# Patient Record
Sex: MALE | Race: WHITE | NOT HISPANIC OR LATINO
[De-identification: names, ages, dates, MRNs, and addresses within clinical notes are randomized per-mention and may not be internally consistent; named-entity substitution may affect disease eponyms.]

---

## 2019-04-09 PROBLEM — Z00.00 ENCOUNTER FOR PREVENTIVE HEALTH EXAMINATION: Status: ACTIVE | Noted: 2019-04-09

## 2019-05-22 ENCOUNTER — RECORD ABSTRACTING (OUTPATIENT)
Age: 59
End: 2019-05-22

## 2019-05-22 DIAGNOSIS — G40.909 EPILEPSY, UNSPECIFIED, NOT INTRACTABLE, W/OUT STATUS EPILEPTICUS: ICD-10-CM

## 2019-05-22 DIAGNOSIS — Z86.69 PERSONAL HISTORY OF OTHER DISEASES OF THE NERVOUS SYSTEM AND SENSE ORGANS: ICD-10-CM

## 2019-05-30 ENCOUNTER — APPOINTMENT (OUTPATIENT)
Dept: NEUROLOGY | Facility: CLINIC | Age: 59
End: 2019-05-30
Payer: MEDICARE

## 2019-05-30 VITALS
SYSTOLIC BLOOD PRESSURE: 113 MMHG | HEIGHT: 68 IN | BODY MASS INDEX: 23.49 KG/M2 | HEART RATE: 75 BPM | DIASTOLIC BLOOD PRESSURE: 76 MMHG | WEIGHT: 155 LBS

## 2019-05-30 PROCEDURE — 64615 CHEMODENERV MUSC MIGRAINE: CPT

## 2019-05-30 PROCEDURE — 99212 OFFICE O/P EST SF 10 MIN: CPT | Mod: 25

## 2019-05-30 RX ORDER — ONABOTULINUMTOXINA 100 [USP'U]/1
100 INJECTION, POWDER, LYOPHILIZED, FOR SOLUTION INTRADERMAL; INTRAMUSCULAR
Qty: 1 | Refills: 0 | Status: COMPLETED | OUTPATIENT
Start: 2019-05-30

## 2019-05-30 RX ORDER — ONABOTULINUMTOXINA 100 [USP'U]/1
100 INJECTION, POWDER, LYOPHILIZED, FOR SOLUTION INTRADERMAL; INTRAMUSCULAR
Qty: 1 | Refills: 0 | Status: COMPLETED
Start: 2019-05-30

## 2019-05-30 RX ADMIN — ONABOTULINUMTOXINA 1 UNIT: 100 INJECTION, POWDER, LYOPHILIZED, FOR SOLUTION INTRADERMAL; INTRAMUSCULAR at 00:00

## 2019-05-30 NOTE — PROCEDURE
[FreeTextEntry1] : Procedure:  Botulinum toxin for chronic migraine [FreeTextEntry2] : headache [FreeTextEntry3] : Procedure:  Botulinum toxin for chronic migraine\par \par Risks and benefits of procedure explained.  Consent for botulinum toxin for chronic migraine signed in chart.  \par \par 200 Units botulinum toxin reconstituted with 4 ml 0.9NS (5U/0.1cc).  \par \par Patient prepped with alcohol/ethyl chloride.  10U corrugators, 5U procerus, 20U frontalis, 40 temporalis, 30 occipitalis, 20 cervical paraspinals, 30 trapezius.  Total of 155U divided in 31 sites.  45U botulinum wasted.  \par \par Patient tolerated procedure well without complications.    \par

## 2019-05-30 NOTE — CONSULT LETTER
[FreeTextEntry1] : Since his last chemodenervation, Mr. Peralta had not had any benefit to botulinum injection.  Headaches on the left side still persists without any significant improvement after the last 2 trials.  Is currently here for last trial of chemodenervation as per Dr. Najjar's recommendations.  Tolerated injections well without any significant side effects.  \par \par Focused Exam:  NAD/Alert and oriented.  Speech fluent, nondysarthric.  CN grossly intact.  NF/NE 5/5. MS 5/5 throughout. Sensory subjectively normal.  No ataxia.\par \par 58 yo M w/ h/o epilepsy s/p surgery with persistent L sided headache at surgical site possible dural entrapment currently here for chemodenervation for intractable headaches.  \par \par Plan:   \par 1.	Return to office in 3 - 4 months for further injections as deemed beneficial\par 2.	F/u with Dr. Najjar\par

## 2019-06-11 ENCOUNTER — APPOINTMENT (OUTPATIENT)
Dept: NEUROLOGY | Facility: CLINIC | Age: 59
End: 2019-06-11

## 2019-07-11 ENCOUNTER — APPOINTMENT (OUTPATIENT)
Dept: NEUROLOGY | Facility: CLINIC | Age: 59
End: 2019-07-11

## 2019-07-16 ENCOUNTER — APPOINTMENT (OUTPATIENT)
Dept: NEUROLOGY | Facility: CLINIC | Age: 59
End: 2019-07-16

## 2019-07-22 ENCOUNTER — APPOINTMENT (OUTPATIENT)
Dept: NEUROLOGY | Facility: CLINIC | Age: 59
End: 2019-07-22
Payer: MEDICARE

## 2019-07-22 VITALS
DIASTOLIC BLOOD PRESSURE: 74 MMHG | WEIGHT: 150 LBS | BODY MASS INDEX: 22.22 KG/M2 | SYSTOLIC BLOOD PRESSURE: 109 MMHG | HEART RATE: 62 BPM | HEIGHT: 69 IN

## 2019-07-22 DIAGNOSIS — Z78.9 OTHER SPECIFIED HEALTH STATUS: ICD-10-CM

## 2019-07-22 PROCEDURE — 99214 OFFICE O/P EST MOD 30 MIN: CPT

## 2019-07-25 PROBLEM — Z78.9 DOES NOT USE ILLICIT DRUGS: Status: ACTIVE | Noted: 2019-07-25

## 2019-07-25 NOTE — DISCUSSION/SUMMARY
[FreeTextEntry1] : Patient with Epilepsy and Muscle tension headaches.\par \par Epilepsy remains stable. Headaches are unresponsive to Botox x3 rounds. At this time we recommend the following: \par \par 1. Stop Botox\par 2. Start Nortriptyline 10mg qHS x 5 days then increase to 20mg. Will call the office in 3 weeks at which time we can make further adjustments if needed. \par 3. Serology as ordered\par 4. Follow up in 8-10 weeks

## 2019-07-25 NOTE — PHYSICAL EXAM
[General Appearance - Alert] : alert [General Appearance - In No Acute Distress] : in no acute distress [Oriented To Time, Place, And Person] : oriented to person, place, and time [Impaired Insight] : insight and judgment were intact [Affect] : the affect was normal [Person] : oriented to person [Place] : oriented to place [Time] : oriented to time [Motor Strength] : muscle strength was normal in all four extremities [2+] : Ankle jerk left 2+ [Extraocular Movements] : extraocular movements were intact [Motor Strength Upper Extremities Bilaterally] : strength was normal in both upper extremities [Motor Strength Lower Extremities Bilaterally] : strength was normal in both lower extremities

## 2019-07-25 NOTE — HISTORY OF PRESENT ILLNESS
[FreeTextEntry1] : Mr. NARANJO presents today for a follow up visit of Epilepsy and Muscle tension headaches.\par \par Epilepsy remains stable. Continues to have infrequent episodes of memory lapses and losing train of thought. Episodes are unchanged from previous. He remains compliant with medication regimen without side effects. \par \par He has thus far received 3 rounds of Botox with Dr. Velásquez, last one in 05/2019. He denies any changes to headaches with treatment. Continues to report generalized pressure occurring 2-3 times a week unresponsive to OTC and does not have any migraine component.

## 2019-09-12 ENCOUNTER — APPOINTMENT (OUTPATIENT)
Dept: NEUROLOGY | Facility: CLINIC | Age: 59
End: 2019-09-12

## 2019-10-02 ENCOUNTER — APPOINTMENT (OUTPATIENT)
Dept: NEUROLOGY | Facility: CLINIC | Age: 59
End: 2019-10-02
Payer: MEDICARE

## 2019-10-02 ENCOUNTER — OUTPATIENT (OUTPATIENT)
Dept: OUTPATIENT SERVICES | Facility: HOSPITAL | Age: 59
LOS: 1 days | Discharge: HOME | End: 2019-10-02

## 2019-10-02 VITALS
BODY MASS INDEX: 22.22 KG/M2 | HEART RATE: 101 BPM | DIASTOLIC BLOOD PRESSURE: 79 MMHG | SYSTOLIC BLOOD PRESSURE: 112 MMHG | WEIGHT: 150 LBS | HEIGHT: 69 IN

## 2019-10-02 DIAGNOSIS — G44.209 TENSION-TYPE HEADACHE, UNSPECIFIED, NOT INTRACTABLE: ICD-10-CM

## 2019-10-02 PROCEDURE — 99214 OFFICE O/P EST MOD 30 MIN: CPT

## 2019-10-03 LAB
CRP SERPL-MCNC: 3.21 MG/DL
ERYTHROCYTE [SEDIMENTATION RATE] IN BLOOD BY WESTERGREN METHOD: 5 MM/HR

## 2019-10-04 NOTE — DISCUSSION/SUMMARY
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 Likely related to surgical scarring however will rule out other etiologies. At this time we recommend the following: \par \par 1. Increase Pamelor to 30mg x 1 week then 40mg and continue\par 2. CT head \par 3. Serology as ordered\par 4. RTC in 6  months, will call to review testing once completed.

## 2019-10-04 NOTE — PHYSICAL EXAM
[General Appearance - Alert] : alert [Oriented To Time, Place, And Person] : oriented to person, place, and time [General Appearance - In No Acute Distress] : in no acute distress [Impaired Insight] : insight and judgment were intact [Affect] : the affect was normal [Person] : oriented to person [Place] : oriented to place [Time] : oriented to time [Motor Strength] : muscle strength was normal in all four extremities [2+] : Ankle jerk left 2+ [Motor Strength Upper Extremities Bilaterally] : strength was normal in both upper extremities [Motor Strength Lower Extremities Bilaterally] : strength was normal in both lower extremities

## 2019-10-04 NOTE — HISTORY OF PRESENT ILLNESS
[FreeTextEntry1] : Mr. NARANJO presents today for a follow up visit of Epilepsy (has a non-functioning VNS) and Muscle tension headaches.\par \par Epilepsy remains stable. Continues to have infrequent episodes of memory lapses and losing train of thought. Episodes are unchanged from previous. He remains compliant with medication regimen without side effects. \par \par He has thus far received 3 rounds of Botox with Dr. Velásquez, last one in 05/2019. He denies any changes to headaches with treatment. Continues to report left sided achy pain. No associated migraine component. Has yet to respond to Pamelor 20mg started since last visit. \par \par Was seen by neurosx Dr. Nunn who feels symptoms are related to surgical scar. Symptoms continue to occur a few times a week. Feels is related to the amount of sleep he gets per night.

## 2019-10-07 LAB — ANA SER IF-ACNC: NEGATIVE

## 2019-10-28 ENCOUNTER — RX RENEWAL (OUTPATIENT)
Age: 59
End: 2019-10-28

## 2020-01-09 ENCOUNTER — RX RENEWAL (OUTPATIENT)
Age: 60
End: 2020-01-09

## 2020-04-15 ENCOUNTER — APPOINTMENT (OUTPATIENT)
Dept: NEUROLOGY | Facility: CLINIC | Age: 60
End: 2020-04-15

## 2020-08-15 ENCOUNTER — TRANSCRIPTION ENCOUNTER (OUTPATIENT)
Age: 60
End: 2020-08-15

## 2020-10-15 ENCOUNTER — APPOINTMENT (OUTPATIENT)
Dept: SURGERY | Facility: CLINIC | Age: 60
End: 2020-10-15
Payer: MEDICARE

## 2020-10-15 VITALS
WEIGHT: 164 LBS | HEART RATE: 77 BPM | TEMPERATURE: 97.2 F | DIASTOLIC BLOOD PRESSURE: 84 MMHG | HEIGHT: 69 IN | BODY MASS INDEX: 24.29 KG/M2 | SYSTOLIC BLOOD PRESSURE: 132 MMHG

## 2020-10-15 DIAGNOSIS — K64.9 UNSPECIFIED HEMORRHOIDS: ICD-10-CM

## 2020-10-15 PROCEDURE — 46600 DIAGNOSTIC ANOSCOPY SPX: CPT

## 2020-10-15 PROCEDURE — 99203 OFFICE O/P NEW LOW 30 MIN: CPT | Mod: 25

## 2020-11-12 ENCOUNTER — APPOINTMENT (OUTPATIENT)
Dept: SURGERY | Facility: CLINIC | Age: 60
End: 2020-11-12
Payer: MEDICARE

## 2020-11-12 VITALS
SYSTOLIC BLOOD PRESSURE: 120 MMHG | HEART RATE: 78 BPM | DIASTOLIC BLOOD PRESSURE: 86 MMHG | WEIGHT: 153 LBS | HEIGHT: 69 IN | BODY MASS INDEX: 22.66 KG/M2 | TEMPERATURE: 96.5 F

## 2020-11-12 DIAGNOSIS — K64.5 PERIANAL VENOUS THROMBOSIS: ICD-10-CM

## 2020-11-12 PROCEDURE — 99072 ADDL SUPL MATRL&STAF TM PHE: CPT

## 2020-11-12 PROCEDURE — 99214 OFFICE O/P EST MOD 30 MIN: CPT

## 2020-11-12 NOTE — HISTORY OF PRESENT ILLNESS
[FreeTextEntry1] : This is a new patient visit for Mr. NARANJO who has complaint of hemorrhoids. Mr. NARANJO states that he started having pain with his hemorrhoids approximately 10 days ago. He noticed a swelling and pain. He uses over-the-counter cream for short period of time. He has had a colonoscopy in the past within the last 2 years. He is no family history of colorectal cancer.

## 2020-11-12 NOTE — PROCEDURE
[FreeTextEntry1] : Anoscopy performed using a disposable anoscope.  The patient was place in the left lateral decubitus position. After CHLOE was performed the anoscope was inserted and the distal rectum was evaluated along with the anal canal and anal margin.\par \par Findings:\par Examination of the perineum reveals an obvious thrombosed external hemorrhoid in the left lateral position there is moderate swelling but it feels soft to touch, there is no erythema induration or drainage or bleeding.\par \par CHLOE, good tone no palpable mass.\par \par Anoscopy, on-prolapsing internal hemorrhoids a smaller thrombosed internal hemorrhoid is also noted.

## 2020-11-12 NOTE — HISTORY OF PRESENT ILLNESS
[FreeTextEntry1] : This is a followup patient visit for Mr. NARANJO who has complaint of hemorrhoids. Mr. NARANJO states that he Is feeling much better. She has used the hydrocortisone cream and feels the pain and swelling has resolved. He denies any bleeding.

## 2020-11-12 NOTE — ASSESSMENT
[FreeTextEntry1] : Mr. NARANJO has a large thrombosed external hemorrhoid and a smaller thrombosed internal hemorrhoid. Both of these are in the early resolution phase. I've prescribed for him some hydrocortisone cream to use 2-3 times a day for the next 2 weeks. I've also advised increase fluid and take stool softeners sitz baths as needed and increase fiber intake. Will return to see me in 3 weeks or certainly sooner should any problems arise.

## 2020-11-12 NOTE — ASSESSMENT
[FreeTextEntry1] : Mr. NARANJO has a resolving thrombosed external hemorrhoid.  She is currently feeling much better and denies pain. He no longer has used hydrocortisone cream. He'll continue the dietary modifications for now. Her return to see me on a p.r.n. basis.

## 2020-11-12 NOTE — PHYSICAL EXAM
[FreeTextEntry1] : Looks and feels well.\par \par Vitals as noted above.\par \par Examination of the perineum reveals nearly resolved thrombosed external hemorrhoid with no significant swelling.

## 2020-11-30 ENCOUNTER — APPOINTMENT (OUTPATIENT)
Dept: NEUROLOGY | Facility: CLINIC | Age: 60
End: 2020-11-30

## 2020-12-14 ENCOUNTER — APPOINTMENT (OUTPATIENT)
Dept: NEUROLOGY | Facility: CLINIC | Age: 60
End: 2020-12-14
Payer: MEDICARE

## 2020-12-14 VITALS
WEIGHT: 161 LBS | TEMPERATURE: 97.4 F | DIASTOLIC BLOOD PRESSURE: 84 MMHG | HEIGHT: 69 IN | HEART RATE: 78 BPM | OXYGEN SATURATION: 97 % | BODY MASS INDEX: 23.85 KG/M2 | SYSTOLIC BLOOD PRESSURE: 125 MMHG

## 2020-12-14 PROCEDURE — 99072 ADDL SUPL MATRL&STAF TM PHE: CPT

## 2020-12-14 PROCEDURE — 99214 OFFICE O/P EST MOD 30 MIN: CPT

## 2020-12-14 NOTE — PHYSICAL EXAM
[General Appearance - Alert] : alert [General Appearance - In No Acute Distress] : in no acute distress [Oriented To Time, Place, And Person] : oriented to person, place, and time [Impaired Insight] : insight and judgment were intact [Affect] : the affect was normal [Person] : oriented to person [Place] : oriented to place [Time] : oriented to time [Motor Strength] : muscle strength was normal in all four extremities [2+] : Ankle jerk left 2+ [Tremor] : a tremor present [Motor Strength Upper Extremities Bilaterally] : strength was normal in both upper extremities [Motor Strength Lower Extremities Bilaterally] : strength was normal in both lower extremities [FreeTextEntry8] : B/L UE FINE TREMOR NOTED WITH ACTION.

## 2020-12-14 NOTE — HISTORY OF PRESENT ILLNESS
[FreeTextEntry1] : Mr. NARANJO presents today for a follow up visit of Epilepsy (has a non-functioning VNS) and Muscle tension headaches.\par \par Epilepsy remains stable. Continues to have infrequent episodes of memory lapses and losing train of thought. Episodes are unchanged from previous. He remains compliant with medication regimen without side effects. \par \par Is reporting some increase in B/L UE tremor. Has been on Primidone. Not affecting fine motor but is noticed. \par \par Compliant with medication as stated. Last blood work done was by PCP and AED levels were in range. Was noted to have subtherapeutic Vitamin D but reports he did not speak to PCP regarding this. \par \par He denies any new neurological complaints at this time. \par \par

## 2020-12-14 NOTE — DISCUSSION/SUMMARY
[Medically Refractory (seizure within the last year)] : Medically Refractory (seizure within the last year) [Simple Partial] : simple partial [Risks Associated with Driving/[place] Law] : As per my usual protocol, the patient was advised in regards to risks and driving privileges associated with the [place] Guidelines.  [Safety Recommendations] : The patient was advised in regards to the risk of seizures and general seizure safety recommendations including not to be bathing alone, climbing to high places and operating heavy machinery. [Compliance with Medications] : The importance of compliance with medications was reinforced. [Sleep Hygiene/Sleep Disruption Risks] : Sleep hygiene and the risks of sleep disruption were discussed. [FreeTextEntry1] : Patient with Epilepsy.\par \par Dong well, no changes. At this time we recommend the following: \par \par 1. Continue VPA: [phone]\par 2. Continue Keppra 1250mg BID\par 3. Continue Primidone 250mg TID\par 4. Serology as ordered. If ammonia elevated, may be causing increase in tremor, may start Carnitor.\par 5. Advised to follow with PCP for Vitamin D levels\par \par All questions and concerns were addressed. Emotional support was rendered.

## 2020-12-15 LAB — AMMONIA PLAS-MCNC: 34 UMOL/L

## 2020-12-16 LAB
ALBUMIN SERPL ELPH-MCNC: 4.5 G/DL
ALP BLD-CCNC: 75 U/L
ALT SERPL-CCNC: 23 U/L
ANION GAP SERPL CALC-SCNC: 15 MMOL/L
AST SERPL-CCNC: 27 U/L
BASOPHILS # BLD AUTO: 0.02 K/UL
BASOPHILS NFR BLD AUTO: 0.5 %
BILIRUB SERPL-MCNC: 0.3 MG/DL
BUN SERPL-MCNC: 20 MG/DL
CALCIUM SERPL-MCNC: 9.6 MG/DL
CHLORIDE SERPL-SCNC: 103 MMOL/L
CO2 SERPL-SCNC: 24 MMOL/L
CREAT SERPL-MCNC: 1 MG/DL
EOSINOPHIL # BLD AUTO: 0.04 K/UL
EOSINOPHIL NFR BLD AUTO: 0.9 %
GLUCOSE SERPL-MCNC: 83 MG/DL
HCT VFR BLD CALC: 44.2 %
HGB BLD-MCNC: 14.3 G/DL
IMM GRANULOCYTES NFR BLD AUTO: 0.2 %
LYMPHOCYTES # BLD AUTO: 1.65 K/UL
LYMPHOCYTES NFR BLD AUTO: 38.4 %
MAN DIFF?: NORMAL
MCHC RBC-ENTMCNC: 29.4 PG
MCHC RBC-ENTMCNC: 32.4 G/DL
MCV RBC AUTO: 90.8 FL
MONOCYTES # BLD AUTO: 0.52 K/UL
MONOCYTES NFR BLD AUTO: 12.1 %
NEUTROPHILS # BLD AUTO: 2.06 K/UL
NEUTROPHILS NFR BLD AUTO: 47.9 %
PHENOBARB SERPL QL: 40.9 UG/ML
PLATELET # BLD AUTO: 156 K/UL
POTASSIUM SERPL-SCNC: 4.8 MMOL/L
PROT SERPL-MCNC: 7.4 G/DL
RBC # BLD: 4.87 M/UL
RBC # FLD: 14.5 %
SODIUM SERPL-SCNC: 142 MMOL/L
VALPROATE SERPL-MCNC: 69 UG/ML
WBC # FLD AUTO: 4.3 K/UL

## 2020-12-22 LAB — LEVETIRACETAM SERPL-MCNC: 17 UG/ML

## 2020-12-23 ENCOUNTER — NON-APPOINTMENT (OUTPATIENT)
Age: 60
End: 2020-12-23

## 2021-06-03 ENCOUNTER — OUTPATIENT (OUTPATIENT)
Dept: OUTPATIENT SERVICES | Facility: HOSPITAL | Age: 61
LOS: 1 days | Discharge: HOME | End: 2021-06-03
Payer: MEDICARE

## 2021-06-03 ENCOUNTER — RX RENEWAL (OUTPATIENT)
Age: 61
End: 2021-06-03

## 2021-06-03 ENCOUNTER — APPOINTMENT (OUTPATIENT)
Dept: PLASTIC SURGERY | Facility: CLINIC | Age: 61
End: 2021-06-03
Payer: MEDICARE

## 2021-06-03 VITALS — HEIGHT: 68 IN | WEIGHT: 150 LBS | BODY MASS INDEX: 22.73 KG/M2

## 2021-06-03 DIAGNOSIS — M72.0 PALMAR FASCIAL FIBROMATOSIS [DUPUYTREN]: ICD-10-CM

## 2021-06-03 PROCEDURE — 99203 OFFICE O/P NEW LOW 30 MIN: CPT

## 2021-06-03 PROCEDURE — 73130 X-RAY EXAM OF HAND: CPT | Mod: 26,50

## 2021-06-04 NOTE — HISTORY OF PRESENT ILLNESS
[FreeTextEntry1] : 61 yr old\par RHD\par retired--disability from epilepsy, h/o seizures\par short term memory issues\par used to work as musician--now retired\par \par nonsmoker\par nondiabetic\par no alcohol use\par no h/o traums to hands\par \par noticed B/L issues (flexion of finger) about 2-3 years ago

## 2021-06-04 NOTE — ASSESSMENT
[FreeTextEntry1] : discussed options for management of Dupuytrens dz\par \par  Patient was prepped and draped in the usual sterile fashion using betadine. 10 cc of 1% lidocaine with epinephrine was used to numb the region. The palmar cord was identified and excised. The incision was closed primarily. The patient tolerated the procedure well and without any complications.\par \par \par Not good candidate for xiaflex injection or needle aponeurotomy\par \par discussed recurrence, need for Hand OT, need for additional surgery, lack of improvement\par \par pt seen w mother (undergoing nasal recon w me)\par \par all ?s asnwered\par \par Due to COVID 19, pre-visit patient instructions were explained to the patient and their symptoms were checked upon arrival.  \par Masks were used by the health care providers and staff and the examination room was cleaned after the patient visit was completed.\par \par B/L hand xryas\par

## 2021-06-04 NOTE — PHYSICAL EXAM
[NI] : Normal [de-identified] : left hand:  1,2,3 normal   4 w MC cord, can almost fully extend 4th digit but mild limitation on terminal extension   5th digit left PIPJ approx 100 degrees.  cord on both 4+5th digits extends to P1\par \par rigth hand:   1,2,3 normal   4 w MC cord, can almost fully extend 4th digit but mild limitation on terminal extension   5th digit left PIPJ approx  degrees.  130 degrees 0cord on both 4+5th digits extends to P1

## 2021-06-07 ENCOUNTER — RX RENEWAL (OUTPATIENT)
Age: 61
End: 2021-06-07

## 2021-06-07 ENCOUNTER — APPOINTMENT (OUTPATIENT)
Dept: NEUROLOGY | Facility: CLINIC | Age: 61
End: 2021-06-07
Payer: MEDICARE

## 2021-06-07 VITALS
BODY MASS INDEX: 24.25 KG/M2 | TEMPERATURE: 97.8 F | HEIGHT: 68 IN | SYSTOLIC BLOOD PRESSURE: 118 MMHG | WEIGHT: 160 LBS | HEART RATE: 74 BPM | DIASTOLIC BLOOD PRESSURE: 80 MMHG

## 2021-06-07 PROCEDURE — 99213 OFFICE O/P EST LOW 20 MIN: CPT

## 2021-06-07 RX ORDER — NORTRIPTYLINE HYDROCHLORIDE 10 MG/1
10 CAPSULE ORAL
Qty: 270 | Refills: 2 | Status: DISCONTINUED | COMMUNITY
Start: 2019-07-23 | End: 2021-06-07

## 2021-06-07 NOTE — HISTORY OF PRESENT ILLNESS
[FreeTextEntry1] : Mr. NARANJO presents today for a follow up visit of Epilepsy (has a non-functioning VNS) and Muscle tension headaches.\par \par Epilepsy remains stable. Continues to have infrequent episodes of memory lapses and losing train of thought. Episodes are unchanged from previous and feel are even less then before. He remains compliant with medication regimen without side effects. \par \par Continues to report B/L UE tremor. Has been on Primidone, not much response. Also continues to report frontal headaches, felt to be related to scar tissue secondary to surgery. \par \par He denies any new neurological complaints at this time. \par \par

## 2021-06-07 NOTE — DISCUSSION/SUMMARY
[FreeTextEntry1] : Patient with Epilepsy (has a non-functioning VNS) and Muscle tension headaches.\par \par Epilepsy is stable and events are even less. Continues to report muscle tension headaches and tremor R>L. Given symptoms, recommend starting Propranolol to help with tremor and headaches. At this time we recommend the following: \par \par 1. Start propranolol 1omg TID x2 weeks then 20mg TID \par 2. TEB in 6 weeks. If doing well, change to Inderal LA 60mg.\par 3. Continue AED meds as above\par \par All questions and concerns were addressed. Emotional support was rendered. \par

## 2021-07-06 ENCOUNTER — OUTPATIENT (OUTPATIENT)
Dept: OUTPATIENT SERVICES | Facility: HOSPITAL | Age: 61
LOS: 1 days | Discharge: HOME | End: 2021-07-06
Payer: MEDICARE

## 2021-07-06 VITALS
HEIGHT: 68 IN | RESPIRATION RATE: 18 BRPM | WEIGHT: 159.39 LBS | DIASTOLIC BLOOD PRESSURE: 67 MMHG | TEMPERATURE: 97 F | OXYGEN SATURATION: 98 % | HEART RATE: 63 BPM | SYSTOLIC BLOOD PRESSURE: 114 MMHG

## 2021-07-06 DIAGNOSIS — Z01.818 ENCOUNTER FOR OTHER PREPROCEDURAL EXAMINATION: ICD-10-CM

## 2021-07-06 DIAGNOSIS — M72.0 PALMAR FASCIAL FIBROMATOSIS [DUPUYTREN]: ICD-10-CM

## 2021-07-06 DIAGNOSIS — Z98.890 OTHER SPECIFIED POSTPROCEDURAL STATES: Chronic | ICD-10-CM

## 2021-07-06 DIAGNOSIS — G40.109 LOCALIZATION-RELATED (FOCAL) (PARTIAL) SYMPTOMATIC EPILEPSY AND EPILEPTIC SYNDROMES WITH SIMPLE PARTIAL SEIZURES, NOT INTRACTABLE, WITHOUT STATUS EPILEPTICUS: Chronic | ICD-10-CM

## 2021-07-06 LAB
ALBUMIN SERPL ELPH-MCNC: 4.7 G/DL — SIGNIFICANT CHANGE UP (ref 3.5–5.2)
ALP SERPL-CCNC: 72 U/L — SIGNIFICANT CHANGE UP (ref 30–115)
ALT FLD-CCNC: 22 U/L — SIGNIFICANT CHANGE UP (ref 0–41)
ANION GAP SERPL CALC-SCNC: 9 MMOL/L — SIGNIFICANT CHANGE UP (ref 7–14)
AST SERPL-CCNC: 19 U/L — SIGNIFICANT CHANGE UP (ref 0–41)
BASOPHILS # BLD AUTO: 0.02 K/UL — SIGNIFICANT CHANGE UP (ref 0–0.2)
BASOPHILS NFR BLD AUTO: 0.4 % — SIGNIFICANT CHANGE UP (ref 0–1)
BILIRUB SERPL-MCNC: 0.3 MG/DL — SIGNIFICANT CHANGE UP (ref 0.2–1.2)
BUN SERPL-MCNC: 19 MG/DL — SIGNIFICANT CHANGE UP (ref 10–20)
CALCIUM SERPL-MCNC: 10.2 MG/DL — HIGH (ref 8.5–10.1)
CHLORIDE SERPL-SCNC: 104 MMOL/L — SIGNIFICANT CHANGE UP (ref 98–110)
CO2 SERPL-SCNC: 29 MMOL/L — SIGNIFICANT CHANGE UP (ref 17–32)
CREAT SERPL-MCNC: 0.8 MG/DL — SIGNIFICANT CHANGE UP (ref 0.7–1.5)
EOSINOPHIL # BLD AUTO: 0.05 K/UL — SIGNIFICANT CHANGE UP (ref 0–0.7)
EOSINOPHIL NFR BLD AUTO: 1 % — SIGNIFICANT CHANGE UP (ref 0–8)
GLUCOSE SERPL-MCNC: 96 MG/DL — SIGNIFICANT CHANGE UP (ref 70–99)
HCT VFR BLD CALC: 44.1 % — SIGNIFICANT CHANGE UP (ref 42–52)
HGB BLD-MCNC: 14.5 G/DL — SIGNIFICANT CHANGE UP (ref 14–18)
IMM GRANULOCYTES NFR BLD AUTO: 0.4 % — HIGH (ref 0.1–0.3)
LYMPHOCYTES # BLD AUTO: 1.75 K/UL — SIGNIFICANT CHANGE UP (ref 1.2–3.4)
LYMPHOCYTES # BLD AUTO: 35.4 % — SIGNIFICANT CHANGE UP (ref 20.5–51.1)
MCHC RBC-ENTMCNC: 29.2 PG — SIGNIFICANT CHANGE UP (ref 27–31)
MCHC RBC-ENTMCNC: 32.9 G/DL — SIGNIFICANT CHANGE UP (ref 32–37)
MCV RBC AUTO: 88.9 FL — SIGNIFICANT CHANGE UP (ref 80–94)
MONOCYTES # BLD AUTO: 0.41 K/UL — SIGNIFICANT CHANGE UP (ref 0.1–0.6)
MONOCYTES NFR BLD AUTO: 8.3 % — SIGNIFICANT CHANGE UP (ref 1.7–9.3)
NEUTROPHILS # BLD AUTO: 2.7 K/UL — SIGNIFICANT CHANGE UP (ref 1.4–6.5)
NEUTROPHILS NFR BLD AUTO: 54.5 % — SIGNIFICANT CHANGE UP (ref 42.2–75.2)
NRBC # BLD: 0 /100 WBCS — SIGNIFICANT CHANGE UP (ref 0–0)
PLATELET # BLD AUTO: 174 K/UL — SIGNIFICANT CHANGE UP (ref 130–400)
POTASSIUM SERPL-MCNC: 4.9 MMOL/L — SIGNIFICANT CHANGE UP (ref 3.5–5)
POTASSIUM SERPL-SCNC: 4.9 MMOL/L — SIGNIFICANT CHANGE UP (ref 3.5–5)
PROT SERPL-MCNC: 7.3 G/DL — SIGNIFICANT CHANGE UP (ref 6–8)
RBC # BLD: 4.96 M/UL — SIGNIFICANT CHANGE UP (ref 4.7–6.1)
RBC # FLD: 14.3 % — SIGNIFICANT CHANGE UP (ref 11.5–14.5)
SODIUM SERPL-SCNC: 142 MMOL/L — SIGNIFICANT CHANGE UP (ref 135–146)
VALPROATE SERPL-MCNC: 94 UG/ML — SIGNIFICANT CHANGE UP (ref 50–100)
WBC # BLD: 4.95 K/UL — SIGNIFICANT CHANGE UP (ref 4.8–10.8)
WBC # FLD AUTO: 4.95 K/UL — SIGNIFICANT CHANGE UP (ref 4.8–10.8)

## 2021-07-06 PROCEDURE — 93010 ELECTROCARDIOGRAM REPORT: CPT

## 2021-07-06 NOTE — H&P PST ADULT - ADDITIONAL PE
pt states he experiences 4-5 seizures a month. + aura. He states his speech is affected but hasn't had grand mal seizure in many years. He also states his memory has been affected.

## 2021-07-06 NOTE — H&P PST ADULT - NSICDXPASTMEDICALHX_GEN_ALL_CORE_FT
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

## 2021-07-06 NOTE — H&P PST ADULT - HISTORY OF PRESENT ILLNESS
CURRENTLY  DENIES ANY CP, SOB,PALPITATIONS,COUGH OR DYSURIA  EXERCISE TOLERANCE 2-3 FOS WITHOUT SOB    AS PER PATIENT  this is his/her complete medical history including medications - PRESCRIPTIONS  OVER THE COUNTER MEDS    pt denies any covid s/s, or tested positive in the past.  Received covid vaccine- XMOS  pt advised self quarantine till day of procedure    Anesthesia Alert  YES--Difficult Airway CLASS IV  NO--History of neck surgery or radiation  NO--Limited ROM of neck  NO--History of Malignant hyperthermia  NO--No personal or family history of Pseudocholinesterase deficiency.  NO--Prior Anesthesia Complication  NO--Latex Allergy  NO--Loose teeth  NO--History of Rheumatoid Arthritis  NO--REBECCA  NO--Bleeding risk  NO--Other_____

## 2021-07-06 NOTE — H&P PST ADULT - REASON FOR ADMISSION
60 Y/O M SCHEDULED FOR PAST FOR DUPUYTRENS CONTRACTURE RELEASE OF LEFT HAND, LEFT FOURTH AND LEFT FIFTH DIGIT ON 7/26/21-  Has contractures on both hands left worse then right. Symptoms have been progressively getting worse over time

## 2021-07-06 NOTE — H&P PST ADULT - NSICDXFAMILYHX_GEN_ALL_CORE_FT
FAMILY HISTORY:  Father  Still living? No  Family history of diabetes mellitus (DM), Age at diagnosis: Age Unknown

## 2021-07-08 ENCOUNTER — NON-APPOINTMENT (OUTPATIENT)
Age: 61
End: 2021-07-08

## 2021-07-10 LAB — LEVETIRACETAM SERPL-MCNC: 30.1 UG/ML — SIGNIFICANT CHANGE UP (ref 10–40)

## 2021-07-23 NOTE — ASU PATIENT PROFILE, ADULT - NS TRANSFER PATIENT BELONGINGS
Clothing black strap watch, gold medical alert bracelet/Wrist Watch/Jewelry/Money (specify)/Clothing

## 2021-07-23 NOTE — ASU PATIENT PROFILE, ADULT - PMH
H/O viral encephalitis  22 yrs old- cause of seixzure disorder  Seizures  HAS SEIZURES 4-5 TIMES A MONTH USUALLY EFFECTING SPEECH NOT TONICV CLONIC

## 2021-07-26 ENCOUNTER — APPOINTMENT (OUTPATIENT)
Dept: PLASTIC SURGERY | Facility: AMBULATORY SURGERY CENTER | Age: 61
End: 2021-07-26
Payer: MEDICARE

## 2021-07-26 ENCOUNTER — OUTPATIENT (OUTPATIENT)
Dept: OUTPATIENT SERVICES | Facility: HOSPITAL | Age: 61
LOS: 1 days | Discharge: HOME | End: 2021-07-26
Payer: MEDICARE

## 2021-07-26 ENCOUNTER — RESULT REVIEW (OUTPATIENT)
Age: 61
End: 2021-07-26

## 2021-07-26 VITALS
RESPIRATION RATE: 20 BRPM | TEMPERATURE: 98 F | HEART RATE: 54 BPM | SYSTOLIC BLOOD PRESSURE: 108 MMHG | OXYGEN SATURATION: 99 % | WEIGHT: 159.39 LBS | HEIGHT: 68 IN | DIASTOLIC BLOOD PRESSURE: 62 MMHG

## 2021-07-26 VITALS
SYSTOLIC BLOOD PRESSURE: 109 MMHG | HEART RATE: 69 BPM | TEMPERATURE: 98 F | DIASTOLIC BLOOD PRESSURE: 60 MMHG | OXYGEN SATURATION: 95 % | RESPIRATION RATE: 12 BRPM

## 2021-07-26 DIAGNOSIS — Z98.890 OTHER SPECIFIED POSTPROCEDURAL STATES: Chronic | ICD-10-CM

## 2021-07-26 PROCEDURE — 26125 RELEASE PALM CONTRACTURE: CPT | Mod: F4

## 2021-07-26 PROCEDURE — 88305 TISSUE EXAM BY PATHOLOGIST: CPT | Mod: 26

## 2021-07-26 PROCEDURE — 26123 RELEASE PALM CONTRACTURE: CPT | Mod: F3

## 2021-07-26 PROCEDURE — 26045 RELEASE PALM CONTRACTURE: CPT | Mod: LT

## 2021-07-26 RX ORDER — LEVETIRACETAM 250 MG/1
1 TABLET, FILM COATED ORAL
Qty: 0 | Refills: 0 | DISCHARGE

## 2021-07-26 RX ORDER — PRIMIDONE 250 MG/1
1 TABLET ORAL
Qty: 0 | Refills: 0 | DISCHARGE

## 2021-07-26 RX ORDER — TRAMADOL HYDROCHLORIDE 50 MG/1
1 TABLET ORAL
Qty: 10 | Refills: 0
Start: 2021-07-26

## 2021-07-26 RX ORDER — ONDANSETRON 8 MG/1
4 TABLET, FILM COATED ORAL ONCE
Refills: 0 | Status: DISCONTINUED | OUTPATIENT
Start: 2021-07-26 | End: 2021-08-09

## 2021-07-26 RX ORDER — PROPRANOLOL HCL 160 MG
0 CAPSULE, EXTENDED RELEASE 24HR ORAL
Qty: 0 | Refills: 0 | DISCHARGE

## 2021-07-26 RX ORDER — DIVALPROEX SODIUM 500 MG/1
0 TABLET, DELAYED RELEASE ORAL
Qty: 0 | Refills: 0 | DISCHARGE

## 2021-07-26 RX ORDER — HYDROMORPHONE HYDROCHLORIDE 2 MG/ML
0.5 INJECTION INTRAMUSCULAR; INTRAVENOUS; SUBCUTANEOUS
Refills: 0 | Status: DISCONTINUED | OUTPATIENT
Start: 2021-07-26 | End: 2021-07-26

## 2021-07-26 RX ORDER — EZETIMIBE 10 MG/1
1 TABLET ORAL
Qty: 0 | Refills: 0 | DISCHARGE

## 2021-07-26 RX ORDER — DEXAMETHASONE 0.5 MG/5ML
8 ELIXIR ORAL ONCE
Refills: 0 | Status: DISCONTINUED | OUTPATIENT
Start: 2021-07-26 | End: 2021-08-09

## 2021-07-26 RX ORDER — METOCLOPRAMIDE HCL 10 MG
10 TABLET ORAL ONCE
Refills: 0 | Status: DISCONTINUED | OUTPATIENT
Start: 2021-07-26 | End: 2021-08-09

## 2021-07-26 RX ORDER — SODIUM CHLORIDE 9 MG/ML
1000 INJECTION, SOLUTION INTRAVENOUS
Refills: 0 | Status: DISCONTINUED | OUTPATIENT
Start: 2021-07-26 | End: 2021-08-09

## 2021-07-26 RX ADMIN — SODIUM CHLORIDE 100 MILLILITER(S): 9 INJECTION, SOLUTION INTRAVENOUS at 14:40

## 2021-07-26 NOTE — CHART NOTE - NSCHARTNOTEFT_GEN_A_CORE
PACU ANESTHESIA ADMISSION NOTE      Procedure: Release, Dupuytren contracture  left hand      Post op diagnosis:  Dupuytren&#x27;s contracture of left hand        ____  Intubated  TV:______       Rate: ______      FiO2: ______    _x___  Patent Airway    _x___  Full return of protective reflexes    _x___  Full recovery from anesthesia / back to baseline status    Vitals:  T(C): 36.4  HR: 54  BP: 108/62  RR: 20  SpO2: 99%    Mental Status:  _x___ Awake   _____ Alert   _____ Drowsy   _____ Sedated    Nausea/Vomiting:  _x___  NO       ______Yes,   See Post - Op Orders         Pain Scale (0-10):  __0___    Treatment: _x___ None    ____ See Post - Op/PCA Orders    Post - Operative Fluids:   __x__ Oral   ____ See Post - Op Orders    Plan: Discharge:   _x___Home       _____Floor     _____Critical Care    _____  Other:_________________    Comments:  No anesthesia issues or complications noted.  Discharge when criteria met.

## 2021-07-26 NOTE — ASU DISCHARGE PLAN (ADULT/PEDIATRIC) - ASU DC SPECIAL INSTRUCTIONSFT
Diet: You may resume your usual diet. Avoid alcohol and excessive salt intake for two weeks following surgery. This will help to minimize swelling.    Medication: Pain medication (Tramadol) has also been sent to your pharmacy for any moderate to severe pain you may experience. Alternatively, you may take Tylenol for mild discomfort. Remember, no Aspirin or NSAIDS (Advil, Motrin, Aleve) as they may increase bruising.    Activity: Keep your hand elevated as much as possible to reduce swelling. You may take care of your personal needs as desired, however, no lifting or strenuous activity is allowed for 4 weeks following surgery. Driving is not permitted for at least two weeks.    Wound care: Keep your dressing clean, dry, and intact until seen by MD/PA. Do not smoke! It delays wound healing and increases the risk of complications.    Personal Hygiene: Do not get the operative area wet. You are allowed to sponge bathe. No tub soaking. You may shower provided the hand is wrapped with a plastic bag.    Things to expect: The operative area may be bruised, swollen, and painful. You may also have temporary numbness and stiffness which will improve over time. Hand therapy will be helpful to obtain maximum function and full range of motion. It will be started after the first or second post-operative visit.    In case of emergency: call the office any time day or night. Post-operative care will be provided in the office one week following surgery. If you do not already have an appointment, please call during regular office hours to schedule: [phone].     We wish you a pleasant recovery.

## 2021-07-26 NOTE — BRIEF OPERATIVE NOTE - OPERATION/FINDINGS
Left Dupuytren's contracture release  Iatrogenic nerve laceration repaired with 9-0 nylon sutures and nerve wrap

## 2021-07-26 NOTE — ASU DISCHARGE PLAN (ADULT/PEDIATRIC) - CARE PROVIDER_API CALL
Sravan Fan)  Plastic Surgery; Surgery of the Hand  [address] [place] [address], [address]  [place] [address]  Phone: [phone]  Fax: [phone]  Follow Up Time:

## 2021-07-28 LAB — SURGICAL PATHOLOGY STUDY: SIGNIFICANT CHANGE UP

## 2021-07-29 DIAGNOSIS — M72.0 PALMAR FASCIAL FIBROMATOSIS [DUPUYTREN]: ICD-10-CM

## 2021-08-02 ENCOUNTER — RX RENEWAL (OUTPATIENT)
Age: 61
End: 2021-08-02

## 2021-08-02 ENCOUNTER — APPOINTMENT (OUTPATIENT)
Dept: PLASTIC SURGERY | Facility: CLINIC | Age: 61
End: 2021-08-02
Payer: MEDICARE

## 2021-08-02 PROBLEM — R56.9 UNSPECIFIED CONVULSIONS: Chronic | Status: ACTIVE | Noted: 2021-07-06

## 2021-08-02 PROBLEM — Z86.61 PERSONAL HISTORY OF INFECTIONS OF THE CENTRAL NERVOUS SYSTEM: Chronic | Status: ACTIVE | Noted: 2021-07-06

## 2021-08-02 PROCEDURE — 99024 POSTOP FOLLOW-UP VISIT: CPT

## 2021-08-02 NOTE — PHYSICAL EXAM
[NI] : Normal [de-identified] : left hand: incision over palm and volar 4th and 5th digits healing appropriately, sutures c/d/i, sensation diminished to distal 5th digit as well as distal ulnar 4th digit but present, no significant erythema, resolving swelling and ecchymoses as expected, ROM limited\par \par rigth hand:   1,2,3 normal   4 w MC cord, can almost fully extend 4th digit but mild limitation on terminal extension   5th digit left PIPJ approx  degrees.  130 degrees 0cord on both 4+5th digits extends to P1

## 2021-08-02 NOTE — ASSESSMENT
[FreeTextEntry1] : 60 y/o M POD #7 s/p release of left hand 4th and 5th digit Dupuytren's contracture with iatrogenic digital nerve injury repaired with nylon sutures and nerve wrap, doing well\par \par -Bandages changed\par -Continue hand rest/elevation\par -Tylenol PRN\par -OT rx given for night time extension splint and ROM exercises, to start after suture removal next week\par -Postop instructions reviewed and all questions answered\par -F/u 1 week\par \par Due to COVID-19, pre-visit patient instructions were explained to the patient and their symptoms were checked upon arrival. Masks were used by the healthcare provider and staff and the examination room was cleaned after the patient visit concluded\par

## 2021-08-02 NOTE — HISTORY OF PRESENT ILLNESS
[FreeTextEntry1] : 61 yr old\par RHD\par retired--disability from epilepsy, h/o seizures\par short term memory issues\par used to work as musician--now retired\par \par nonsmoker\par nondiabetic\par no alcohol use\par no h/o traums to hands\par \par noticed B/L issues (flexion of finger) about 2-3 years ago\par \par Interval hx (8/2/21): Pt presents today POD #7 s/p release of left hand 4th and 5th digit Dupuytren's contracture with iatrogenic digital nerve injury repaired with nylon sutures and nerve wrap. C/o pain POD 1-2 that has since improved as well as numbness. Denies f/c or drainage. Compliant with hand rest and elevation.

## 2021-08-09 ENCOUNTER — APPOINTMENT (OUTPATIENT)
Dept: PLASTIC SURGERY | Facility: CLINIC | Age: 61
End: 2021-08-09
Payer: MEDICARE

## 2021-08-09 ENCOUNTER — APPOINTMENT (OUTPATIENT)
Dept: NEUROLOGY | Facility: CLINIC | Age: 61
End: 2021-08-09

## 2021-08-09 PROCEDURE — 99024 POSTOP FOLLOW-UP VISIT: CPT

## 2021-08-09 NOTE — PHYSICAL EXAM
[de-identified] : NAD [de-identified] : left hand: incision over palm and volar 4th and 5th digits healing appropriately, sutures c/d/i, sensation diminished to distal 5th digit as well as distal ulnar 4th digit but present, no significant erythema, resolving swelling and ecchymoses as expected, ROM limited\par \par rigth hand:   1,2,3 normal   4 w MC cord, can almost fully extend 4th digit but mild limitation on terminal extension   5th digit left PIPJ approx  degrees.  130 degrees 0cord on both 4+5th digits extends to P1

## 2021-08-09 NOTE — HISTORY OF PRESENT ILLNESS
[FreeTextEntry1] : 61 yr old RHD M with PMHx of seizure disorder \par retired--disability from epilepsy, short term memory issues\par used to work as musician--now retired\par \par nonsmoker\par nondiabetic\par no alcohol use\par no h/o trauma to hands\par \par noticed B/L issues (flexion of finger) about 2-3 years ago\par \par Interval hx (8/2/21): Pt presents today POD #7 s/p release of left hand 4th and 5th digit Dupuytren's contracture with iatrogenic digital nerve injury repaired with nylon sutures and nerve wrap. C/o pain POD 1-2 that has since improved as well as numbness. Denies f/c or drainage. Compliant with hand rest and elevation.\par \par Interval hx (8/9/21): Pt presents today POD #14 s/p release of left hand 4th and 5th digit Dupuytren's contracture with iatrogenic digital nerve injury repaired with nylon sutures and nerve wrap. Doing well with improving discomfort. Denies any f/c or drainage.

## 2021-08-09 NOTE — DATA REVIEWED
[FreeTextEntry1] : Pathology             Final\par \par No Documents Attached\par \par \par \par   Jasminner Accession Number : [id number]\par \par MARIIA NARANJO                   1\par \par \par \par Surgical Final Report\par \par \par \par \par Final Diagnosis\par Left hand fibrous tissue:\par - Fibromatosis.\par \par Verified by: Gabbie Schuster M.D.\par (Electronic Signature)\par Reported on: 07/28/21 13:56 EDT, [address] [place] [id number] Phone: [phone]   Fax: [phone]\par _________________________________________________________________\par \par Clinical History\par Excision left hand fibrosis cord\par Dupuytren's disease\par COVID negative\par \par Specimen(s) Submitted\par Left hand fibrous tissue\par \par Gross Description\par The specimen is received in formalin, labeled "left hand fibrous\par tissue" and consists of multiple irregular fragments of gray to\par pink firm tissue measuring in aggregate 4 x 3 x 0.5 cm.\par Sectioning reveals the tissue has a white fibrous cut surface.\par Representative sections are submitted. (1 block)\par \par Specimen was received and underwent gross examination at [facility], [address] [place] [address],[facility] [id number].\par \par 07/27/2021 09:36:41 EDT jl\par \par Perioperative Diagnosis\par Left Dupuytren's\par \par  \par \par  Ordered by: NANCI WALKER IV       Collected/Examined: 26Jul2021 01:00PM       \par Verification Required       Stage: Final       \par  Performed at: [facility]       Resulted: [id number] 01:56PM       Last Updated: 28Jul2021 01:56PM       Accession: [id number]

## 2021-08-09 NOTE — ASSESSMENT
[FreeTextEntry1] : 60 y/o M POD #14 s/p release of left hand 4th and 5th digit Dupuytren's contracture with iatrogenic digital nerve injury repaired with nylon sutures and nerve wrap, doing well.\par \par -Sutures removed \par -Continue hand rest/elevation\par -Tylenol PRN\par -may start OT, rx given last time for night time extension splint and ROM exercises\par -Postop instructions reviewed and all questions answered\par -F/u 1 month with Dr. Fan \par \par Due to COVID-19, pre-visit patient instructions were explained to the patient and their symptoms were checked upon arrival. Masks were used by the healthcare provider and staff and the examination room was cleaned after the patient visit concluded\par

## 2021-09-07 ENCOUNTER — APPOINTMENT (OUTPATIENT)
Dept: PLASTIC SURGERY | Facility: CLINIC | Age: 61
End: 2021-09-07
Payer: MEDICARE

## 2021-09-07 PROCEDURE — 99024 POSTOP FOLLOW-UP VISIT: CPT

## 2021-09-07 NOTE — HISTORY OF PRESENT ILLNESS
[FreeTextEntry1] : 61 yr old RHD M with PMHx of seizure disorder \par retired--disability from epilepsy, short term memory issues\par used to work as musician--now retired\par \par nonsmoker\par nondiabetic\par no alcohol use\par no h/o trauma to hands\par \par noticed B/L issues (flexion of finger) about 2-3 years ago\par \par Interval hx (8/2/21): Pt presents today POD #7 s/p release of left hand 4th and 5th digit Dupuytren's contracture with iatrogenic digital nerve injury repaired with nylon sutures and nerve wrap. C/o pain POD 1-2 that has since improved as well as numbness. Denies f/c or drainage. Compliant with hand rest and elevation.\par \par Interval hx (8/9/21): Pt presents today POD #14 s/p release of left hand 4th and 5th digit Dupuytren's contracture with iatrogenic digital nerve injury repaired with nylon sutures and nerve wrap. Doing well with improving discomfort. Denies any f/c or drainage.       \par \par Interval hx (9/7/21): Pt presents today 6 weeks s/p release of left hand 4th and 5th digit Dupuytren's contracture with iatrogenic digital nerve injury repaired with nylon sutures and nerve wrap. Compliant with Hand OT and splinting 3x/day for an hour each time. Reports numbness is nearly completely resolved. C/o difficulty bending digits.

## 2021-09-07 NOTE — DATA REVIEWED
[FreeTextEntry1] : Pathology             Final\par \par No Documents Attached\par \par \par \par   Jasminner Accession Number : [id number]\par \par MARIIA NARANJO                   1\par \par \par \par Surgical Final Report\par \par \par \par \par Final Diagnosis\par Left hand fibrous tissue:\par - Fibromatosis.\par \par Verified by: Gabbie Schuster M.D.\par (Electronic Signature)\par Reported on: 07/28/21 13:56 EDT, [address] [place] [id number] Phone: [phone]   Fax: [phone]\par _________________________________________________________________\par \par Clinical History\par Excision left hand fibrosis cord\par Dupuytren's disease\par COVID negative\par \par Specimen(s) Submitted\par Left hand fibrous tissue\par \par Gross Description\par The specimen is received in formalin, labeled "left hand fibrous\par tissue" and consists of multiple irregular fragments of gray to\par pink firm tissue measuring in aggregate 4 x 3 x 0.5 cm.\par Sectioning reveals the tissue has a white fibrous cut surface.\par Representative sections are submitted. (1 block)\par \par Specimen was received and underwent gross examination at [facility], [address] [place] [address],[facility] [address].\par \par 07/27/2021 09:36:41 EDT jl\par \par Perioperative Diagnosis\par Left Dupuytren's\par \par  \par \par  Ordered by: NANCI WALKER IV       Collected/Examined: 26Jul2021 01:00PM       \par Verification Required       Stage: Final       \par  Performed at: [facility]       Resulted: [id number] 01:56PM       Last Updated: 28Jul2021 01:56PM       Accession: [id number]

## 2021-09-07 NOTE — ASSESSMENT
[FreeTextEntry1] : 62 y/o M POD #14 s/p release of left hand 4th and 5th digit Dupuytren's contracture with iatrogenic digital nerve injury repaired with nylon sutures and nerve wrap, doing well.\par \par -Sutures removed \par -Continue hand rest/elevation\par -Tylenol PRN\par -may start OT, rx given last time for night time extension splint and ROM exercises\par -Postop instructions reviewed and all questions answered\par -F/u 1 month with Dr. Fan \par \par Due to COVID-19, pre-visit patient instructions were explained to the patient and their symptoms were checked upon arrival. Masks were used by the healthcare provider and staff and the examination room was cleaned after the patient visit concluded\par \par as above\par on 5th week of Hand OT\par d/w Ilda of Hand OT--cont to encourage OT and ROM\par \par medrol dose pack\par \par f/u 3 months\par \par

## 2021-09-07 NOTE — DATA REVIEWED
[FreeTextEntry1] : Pathology             Final\par \par No Documents Attached\par \par \par \par   Jasminner Accession Number : [id number]\par \par MARIIA NARANJO                   1\par \par \par \par Surgical Final Report\par \par \par \par \par Final Diagnosis\par Left hand fibrous tissue:\par - Fibromatosis.\par \par Verified by: Gabbie Schuster M.D.\par (Electronic Signature)\par Reported on: 07/28/21 13:56 EDT, [address] [place] [id number] Phone: [phone]   Fax: [phone]\par _________________________________________________________________\par \par Clinical History\par Excision left hand fibrosis cord\par Dupuytren's disease\par COVID negative\par \par Specimen(s) Submitted\par Left hand fibrous tissue\par \par Gross Description\par The specimen is received in formalin, labeled "left hand fibrous\par tissue" and consists of multiple irregular fragments of gray to\par pink firm tissue measuring in aggregate 4 x 3 x 0.5 cm.\par Sectioning reveals the tissue has a white fibrous cut surface.\par Representative sections are submitted. (1 block)\par \par Specimen was received and underwent gross examination at [facility], [address] [place] [address],[facility] [address].\par \par 07/27/2021 09:36:41 EDT jl\par \par Perioperative Diagnosis\par Left Dupuytren's\par \par  \par \par  Ordered by: NANCI WALKER IV       Collected/Examined: 26Jul2021 01:00PM       \par Verification Required       Stage: Final       \par  Performed at: [facility]       Resulted: [id number] 01:56PM       Last Updated: 28Jul2021 01:56PM       Accession: [id number]

## 2021-09-07 NOTE — PHYSICAL EXAM
[de-identified] : NAD [de-identified] : left hand: incision over palm and volar 4th and 5th digits healing appropriately, sutures c/d/i, sensation diminished to distal 5th digit as well as distal ulnar 4th digit but present, no significant erythema, resolving swelling and ecchymoses as expected, ROM limited\par \par rigth hand:   1,2,3 normal   4 w MC cord, can almost fully extend 4th digit but mild limitation on terminal extension   5th digit left PIPJ approx  degrees.  130 degrees 0cord on both 4+5th digits extends to P1

## 2021-09-07 NOTE — PHYSICAL EXAM
[de-identified] : NAD [de-identified] : left hand: incision over palm and volar 4th and 5th digits healing appropriately, sutures c/d/i, sensation diminished to distal 5th digit as well as distal ulnar 4th digit but present, no significant erythema, resolving swelling and ecchymoses as expected, ROM limited\par \par rigth hand:   1,2,3 normal   4 w MC cord, can almost fully extend 4th digit but mild limitation on terminal extension   5th digit left PIPJ approx  degrees.  130 degrees 0cord on both 4+5th digits extends to P1

## 2021-09-07 NOTE — ASSESSMENT
[FreeTextEntry1] : 60 y/o M POD #14 s/p release of left hand 4th and 5th digit Dupuytren's contracture with iatrogenic digital nerve injury repaired with nylon sutures and nerve wrap, doing well.\par \par -Sutures removed \par -Continue hand rest/elevation\par -Tylenol PRN\par -may start OT, rx given last time for night time extension splint and ROM exercises\par -Postop instructions reviewed and all questions answered\par -F/u 1 month with Dr. Fan \par \par Due to COVID-19, pre-visit patient instructions were explained to the patient and their symptoms were checked upon arrival. Masks were used by the healthcare provider and staff and the examination room was cleaned after the patient visit concluded\par \par as above\par on 5th week of Hand OT\par d/w Ilda of Hand OT--cont to encourage OT and ROM\par \par medrol dose pack\par \par f/u 3 months\par \par

## 2021-09-09 ENCOUNTER — RX RENEWAL (OUTPATIENT)
Age: 61
End: 2021-09-09

## 2021-09-28 ENCOUNTER — OUTPATIENT (OUTPATIENT)
Dept: OUTPATIENT SERVICES | Facility: HOSPITAL | Age: 61
LOS: 1 days | Discharge: HOME | End: 2021-09-28

## 2021-09-28 DIAGNOSIS — M72.0 PALMAR FASCIAL FIBROMATOSIS [DUPUYTREN]: ICD-10-CM

## 2021-09-28 DIAGNOSIS — Z98.890 OTHER SPECIFIED POSTPROCEDURAL STATES: Chronic | ICD-10-CM

## 2021-10-20 ENCOUNTER — RX RENEWAL (OUTPATIENT)
Age: 61
End: 2021-10-20

## 2021-10-21 ENCOUNTER — OUTPATIENT (OUTPATIENT)
Dept: OUTPATIENT SERVICES | Facility: HOSPITAL | Age: 61
LOS: 1 days | Discharge: HOME | End: 2021-10-21

## 2021-10-21 DIAGNOSIS — M72.0 PALMAR FASCIAL FIBROMATOSIS [DUPUYTREN]: ICD-10-CM

## 2021-10-21 DIAGNOSIS — Z98.890 OTHER SPECIFIED POSTPROCEDURAL STATES: Chronic | ICD-10-CM

## 2021-11-01 ENCOUNTER — APPOINTMENT (OUTPATIENT)
Dept: NEUROLOGY | Facility: CLINIC | Age: 61
End: 2021-11-01
Payer: MEDICARE

## 2021-11-01 ENCOUNTER — NON-APPOINTMENT (OUTPATIENT)
Age: 61
End: 2021-11-01

## 2021-11-01 VITALS
WEIGHT: 160 LBS | DIASTOLIC BLOOD PRESSURE: 81 MMHG | HEIGHT: 68 IN | HEART RATE: 79 BPM | OXYGEN SATURATION: 98 % | TEMPERATURE: 97.1 F | BODY MASS INDEX: 24.25 KG/M2 | SYSTOLIC BLOOD PRESSURE: 120 MMHG

## 2021-11-01 PROCEDURE — 99213 OFFICE O/P EST LOW 20 MIN: CPT

## 2021-11-01 NOTE — DISCUSSION/SUMMARY
[FreeTextEntry1] : Patient with Epilepsy (has a non-functioning VNS) and Muscle tension headaches.\par \par Epilepsy is stable and events are even less. Continues to report muscle tension headaches 1x week. Tremor has improved with propranolol. Discussed that high daily caffeine intake can add to tremor and headaches. Discussed cutting daily intake in half.  At this time we recommend the following: \par \par 1. Continue Inderal LA 60mg\par 2. Start magnesium 200mg\par 3. MRI brain wo\par \par All questions and concerns were addressed. Emotional support was rendered. \par

## 2021-11-01 NOTE — PHYSICAL EXAM
[General Appearance - Alert] : alert [General Appearance - In No Acute Distress] : in no acute distress [Oriented To Time, Place, And Person] : oriented to person, place, and time [Impaired Insight] : insight and judgment were intact [Affect] : the affect was normal [Person] : oriented to person [Place] : oriented to place [Time] : oriented to time [Motor Strength] : muscle strength was normal in all four extremities [Tremor] : a tremor present [2+] : Ankle jerk left 2+ [Motor Strength Upper Extremities Bilaterally] : strength was normal in both upper extremities [Motor Strength Lower Extremities Bilaterally] : strength was normal in both lower extremities [FreeTextEntry8] : B/L UE FINE TREMOR NOTED WITH ACTION.

## 2021-11-01 NOTE — HISTORY OF PRESENT ILLNESS
[FreeTextEntry1] : Mr. NARANJO presents today for a follow up visit of Epilepsy (has a non-functioning VNS) and Muscle tension headaches.\par \par Epilepsy remains stable. Continues to have infrequent episodes of memory lapses and losing train of thought. Episodes are unchanged from previous and feel are even less then before. He remains compliant with medication regimen without side effects. \par \par Continues to report B/L UE tremor. Has been on Primidone, not much response. Started on Propranolol over the summer, feels improvement in both HA and tremor. Mom is still concerned with headaches given are 1x week lasting 24-36 hours. symptoms are unrelieved with OTC. \par \par In discussion, patient mostly drink coffee and ice tea throughout the day, very little water intake. \par \par He denies any new neurological complaints at this time.

## 2021-11-10 ENCOUNTER — OUTPATIENT (OUTPATIENT)
Dept: OUTPATIENT SERVICES | Facility: HOSPITAL | Age: 61
LOS: 1 days | Discharge: HOME | End: 2021-11-10
Payer: MEDICARE

## 2021-11-10 ENCOUNTER — RESULT REVIEW (OUTPATIENT)
Age: 61
End: 2021-11-10

## 2021-11-10 DIAGNOSIS — G44.209 TENSION-TYPE HEADACHE, UNSPECIFIED, NOT INTRACTABLE: ICD-10-CM

## 2021-11-10 DIAGNOSIS — Z98.890 OTHER SPECIFIED POSTPROCEDURAL STATES: Chronic | ICD-10-CM

## 2021-11-10 PROCEDURE — 70551 MRI BRAIN STEM W/O DYE: CPT | Mod: 26

## 2021-12-07 ENCOUNTER — APPOINTMENT (OUTPATIENT)
Dept: PLASTIC SURGERY | Facility: CLINIC | Age: 61
End: 2021-12-07

## 2022-01-20 ENCOUNTER — RX RENEWAL (OUTPATIENT)
Age: 62
End: 2022-01-20

## 2022-03-07 ENCOUNTER — APPOINTMENT (OUTPATIENT)
Dept: NEUROLOGY | Facility: CLINIC | Age: 62
End: 2022-03-07
Payer: MEDICARE

## 2022-03-07 VITALS
WEIGHT: 160 LBS | HEART RATE: 132 BPM | OXYGEN SATURATION: 98 % | DIASTOLIC BLOOD PRESSURE: 67 MMHG | SYSTOLIC BLOOD PRESSURE: 101 MMHG | TEMPERATURE: 97 F | HEIGHT: 68 IN | BODY MASS INDEX: 24.25 KG/M2

## 2022-03-07 PROCEDURE — 99213 OFFICE O/P EST LOW 20 MIN: CPT

## 2022-03-07 RX ORDER — PROPRANOLOL HYDROCHLORIDE 10 MG/1
10 TABLET ORAL
Qty: 437 | Refills: 0 | Status: DISCONTINUED | COMMUNITY
Start: 2021-06-07 | End: 2022-03-07

## 2022-03-07 RX ORDER — METHYLPREDNISOLONE 4 MG/1
4 TABLET ORAL
Qty: 1 | Refills: 3 | Status: DISCONTINUED | COMMUNITY
Start: 2021-09-07 | End: 2022-03-07

## 2022-03-07 RX ORDER — HYDROCORTISONE 25 MG/G
2.5 CREAM TOPICAL
Qty: 30 | Refills: 1 | Status: DISCONTINUED | COMMUNITY
Start: 2020-10-15 | End: 2022-03-07

## 2022-03-07 RX ORDER — EZETIMIBE 10 MG/1
10 TABLET ORAL
Qty: 90 | Refills: 0 | Status: ACTIVE | COMMUNITY
Start: 2022-02-09

## 2022-03-07 NOTE — REVIEW OF SYSTEMS
Addended by: Saji Moralez on: 10/7/2021 02:34 PM     Modules accepted: Orders [As Noted in HPI] : as noted in HPI [Negative] : Psychiatric

## 2022-03-07 NOTE — HISTORY OF PRESENT ILLNESS
[FreeTextEntry1] : Mr. NARANJO presents today for a follow up visit of Epilepsy (has a non-functioning VNS) and Muscle tension headaches.\par \par Epilepsy remains stable. Continues to have infrequent episodes of memory lapses and losing train of thought. Episodes are unchanged from previous and feel are even less then before. He remains compliant with medication regimen without side effects. \par \par Continues to report B/L UE tremor that also remains stable. Has been on Primidone and Inderal. Though with addition of Inderal, patient does not see much change.\par \par Continues to report headaches, mostly to the surgical scar at least 2 times a week.  At last visit discussed increasing water intake and minimizing iced tea and caffeine. patient also has an erratic sleep pattern. Patient has not made any changes. \par \par Last MRI stable from 11/2021.\par \par

## 2022-03-07 NOTE — DISCUSSION/SUMMARY
[FreeTextEntry1] : Patient with Epilepsy (has a non-functioning VNS) and Muscle tension headaches.\par \par Epilepsy is stable and events are even less. Continues to report muscle tension headaches. Hs not made any changes to lifestyle since last seen as recommended. Jordyn was again educated regarding importance of hydration, nutrition and adequate sleep in helping prevent migraine headaches. Given he has not made any changes will wait 6 weeks. if he makes changes and does not see any improvement consider changing Inderal to Qulipta.\par \par TEB in 6 weeks\par \par All questions and concerns were addressed.

## 2022-04-06 ENCOUNTER — NON-APPOINTMENT (OUTPATIENT)
Age: 62
End: 2022-04-06

## 2022-04-14 ENCOUNTER — APPOINTMENT (OUTPATIENT)
Dept: NEUROLOGY | Facility: CLINIC | Age: 62
End: 2022-04-14
Payer: MEDICARE

## 2022-04-14 PROCEDURE — 99213 OFFICE O/P EST LOW 20 MIN: CPT | Mod: 95

## 2022-04-14 NOTE — DISCUSSION/SUMMARY
[FreeTextEntry1] : Patient with Epilepsy (has a non-functioning VNS) and Muscle tension headaches.\par \par Epilepsy unchanged, continues to average 2 episodes of cognitive changes a month. Headaches have been showing some improvement with increase in fluid intake and 1 session of acupuncture. Given improvement recommend continuing. If symptoms progress with discuss med changes at that time. \par \par RPA in 8 weeks \par \par All questions and concerns were addressed to the best of my ability. Emotional support was rendered.

## 2022-06-06 ENCOUNTER — APPOINTMENT (OUTPATIENT)
Dept: NEUROLOGY | Facility: CLINIC | Age: 62
End: 2022-06-06
Payer: MEDICARE

## 2022-06-06 VITALS
BODY MASS INDEX: 24.1 KG/M2 | SYSTOLIC BLOOD PRESSURE: 114 MMHG | HEIGHT: 68 IN | HEART RATE: 73 BPM | OXYGEN SATURATION: 98 % | WEIGHT: 159 LBS | DIASTOLIC BLOOD PRESSURE: 79 MMHG

## 2022-06-06 PROCEDURE — 99214 OFFICE O/P EST MOD 30 MIN: CPT

## 2022-06-09 NOTE — HISTORY OF PRESENT ILLNESS
[FreeTextEntry1] : Mr. NARANJO presents today for a follow up visit of Epilepsy (has a non-functioning VNS) and Muscle tension headaches.\par \par Epilepsy remains stable. Continues to have infrequent episodes of memory lapses and losing train of thought. Episodes are unchanged from previous and feel are even less then before. He remains compliant with medication regimen without side effects. \par \par Continues to report B/L UE tremor that also remains stable. Has been on Primidone and Inderal. Though with addition of Inderal, patient does not see much change.\par \par Since last seen patient has increased his fluid intake and received 1 session of acupuncture. With changes he feels there may be some improvement in his headaches with intensity and frequency. At this time patient continues to report migraines 3 x a week. Admits to not eating as he should and sleeps most of the day. \par \par Last MRI stable from 11/2021.

## 2022-06-09 NOTE — DISCUSSION/SUMMARY
[FreeTextEntry1] : Patient with Epilepsy (has a non-functioning VNS) and Chronic migraine headaches.\par \par Epilepsy unchanged, continues to average 2 episodes of cognitive changes a month. \par \par Thought  migraines were showing some improvement with increase in fluid intake and 1 session of acupuncture. At this time he continues to report migraines 3 x week. At this time we recommend the following: \par \par 1. Start Qulipta 10mg daily\par 2. Educated that maintaining adequate diet and hydration will help improve headaches frequency and intensity.  \par 3. TEB 8 weeks after starting medication \par \par All questions and concerns were addressed to the best of my ability. Emotional support was rendered.

## 2022-12-15 ENCOUNTER — RX RENEWAL (OUTPATIENT)
Age: 62
End: 2022-12-15

## 2023-03-06 ENCOUNTER — APPOINTMENT (OUTPATIENT)
Dept: NEUROLOGY | Facility: CLINIC | Age: 63
End: 2023-03-06
Payer: MEDICARE

## 2023-03-06 ENCOUNTER — NON-APPOINTMENT (OUTPATIENT)
Age: 63
End: 2023-03-06

## 2023-03-06 VITALS
DIASTOLIC BLOOD PRESSURE: 82 MMHG | HEART RATE: 102 BPM | BODY MASS INDEX: 25.46 KG/M2 | HEIGHT: 68 IN | WEIGHT: 168 LBS | SYSTOLIC BLOOD PRESSURE: 124 MMHG

## 2023-03-06 PROCEDURE — 99214 OFFICE O/P EST MOD 30 MIN: CPT

## 2023-03-06 RX ORDER — ATOGEPANT 10 MG/1
10 TABLET ORAL
Qty: 90 | Refills: 0 | Status: DISCONTINUED | COMMUNITY
Start: 2022-06-06 | End: 2023-03-06

## 2023-03-06 NOTE — DISCUSSION/SUMMARY
[FreeTextEntry1] : Patient with Epilepsy (has a non-functioning VNS) and Chronic migraine headaches.\par \par Epilepsy unchanged, thought feels episodes are less since he has increaed fluid intake. \par Continues to repoert headaches 1-2 times a month that respond to rest and Excedrin. Doing well at this itme. is symptomsprogress, consider intervention at that time. \par \par \par - Educated that maintaining adequate diet and hydration will help improve headaches frequency and intensity.  \par -Continue meds as above\par -RPA in 6months if stable \par \par All questions and concerns were addressed to the best of my ability. Emotional support was rendered.

## 2023-03-06 NOTE — HISTORY OF PRESENT ILLNESS
[FreeTextEntry1] : Mr. NARANJO presents today for a follow up visit of Epilepsy (has a non-functioning VNS) and Muscle tension headaches.\par \par Epilepsy remains stable. Continues to have infrequent episodes of memory lapses and losing train of thought. Episodes are unchanged from previous and feel are even less then before. He remains compliant with medication regimen without side effects. \par \par Continues to report B/L UE tremor that also remains stable. Has been on Primidone and Inderal. Though with addition of Inderal, patient does not see much change.\par \par Patietn was instructed to add Qulipita to medication regimen given he was reporting frequent headaches. patient did not start medication. Currently reporting headaches every 1-2 weeks that can last 1-2 days at a time. Takes Excedrin with relief and rests in  a dark room. Patient has significantly increased fluid intake and feels headaches and seizure episodes have decrease. \par \par Last MRI stable from 11/2021.Seen by PCP, mom will send us a copy if labs recently drawn.

## 2023-07-03 ENCOUNTER — APPOINTMENT (OUTPATIENT)
Dept: ORTHOPEDIC SURGERY | Facility: CLINIC | Age: 63
End: 2023-07-03
Payer: MEDICARE

## 2023-07-03 DIAGNOSIS — M72.0 PALMAR FASCIAL FIBROMATOSIS [DUPUYTREN]: ICD-10-CM

## 2023-07-03 PROCEDURE — 99202 OFFICE O/P NEW SF 15 MIN: CPT

## 2023-07-03 NOTE — PHYSICAL EXAM
[de-identified] : Patient has a palpable cord present along the ring finger giving him a 70 degree contracture of the MP joint.  That is of the ring finger.  He has a contracture with an abductor cord of the little finger PIP joint.  Is about a 20 degree contracture.  He has a positive Hueston tabletop test.  Normal capillary refill erythema ecchymosis abrasions.  Good flexion of the fingers.

## 2023-07-03 NOTE — ASSESSMENT
[FreeTextEntry1] : Patient has Dupuytren's contracture present on the right hand.  Patient will be set up for Xiaflex treatment.  We discussed surgical invention as well as needle aponeurotomy.  He is opting for Xiaflex treatment.  Recurrence was discussed.  Skin care is discussed.  Splinting was discussed.  Tendon injury was discussed.  We will see us back for treatment. 2 Vials are necessary as he has an abductor cord to the little finger and a pretendinous cord to the ring finger

## 2023-07-03 NOTE — HISTORY OF PRESENT ILLNESS
[de-identified] : 64-year-old male history of epilepsy comes in with complaints of contracture of his right hand.  Patient had surgery for left-sided Dupuytren's contracture done a couple years ago.  He has done pretty well without he comes in now because of Dupuytren's contracture on the right hand.

## 2023-09-05 ENCOUNTER — APPOINTMENT (OUTPATIENT)
Dept: ORTHOPEDIC SURGERY | Facility: CLINIC | Age: 63
End: 2023-09-05

## 2023-09-18 ENCOUNTER — APPOINTMENT (OUTPATIENT)
Dept: NEUROLOGY | Facility: CLINIC | Age: 63
End: 2023-09-18
Payer: MEDICARE

## 2023-09-18 VITALS
WEIGHT: 165 LBS | HEART RATE: 66 BPM | SYSTOLIC BLOOD PRESSURE: 129 MMHG | TEMPERATURE: 98 F | BODY MASS INDEX: 25.01 KG/M2 | HEIGHT: 68 IN | OXYGEN SATURATION: 98 % | DIASTOLIC BLOOD PRESSURE: 86 MMHG

## 2023-09-18 DIAGNOSIS — G44.209 TENSION-TYPE HEADACHE, UNSPECIFIED, NOT INTRACTABLE: ICD-10-CM

## 2023-09-18 PROCEDURE — 99213 OFFICE O/P EST LOW 20 MIN: CPT

## 2024-03-11 ENCOUNTER — APPOINTMENT (OUTPATIENT)
Dept: NEUROLOGY | Facility: CLINIC | Age: 64
End: 2024-03-11
Payer: MEDICARE

## 2024-03-11 VITALS
BODY MASS INDEX: 21.82 KG/M2 | HEART RATE: 73 BPM | WEIGHT: 144 LBS | SYSTOLIC BLOOD PRESSURE: 121 MMHG | DIASTOLIC BLOOD PRESSURE: 78 MMHG | HEIGHT: 68 IN

## 2024-03-11 DIAGNOSIS — R56.9 UNSPECIFIED CONVULSIONS: ICD-10-CM

## 2024-03-11 PROCEDURE — 99214 OFFICE O/P EST MOD 30 MIN: CPT

## 2024-03-11 PROCEDURE — G2211 COMPLEX E/M VISIT ADD ON: CPT

## 2024-03-11 RX ORDER — LEVETIRACETAM 250 MG/1
250 TABLET, FILM COATED ORAL
Qty: 180 | Refills: 1 | Status: ACTIVE | COMMUNITY
Start: 2019-10-28 | End: 1900-01-01

## 2024-03-11 RX ORDER — DIVALPROEX SODIUM 500 MG/1
500 TABLET, DELAYED RELEASE ORAL
Qty: 360 | Refills: 1 | Status: ACTIVE | COMMUNITY
Start: 2021-08-02 | End: 1900-01-01

## 2024-03-11 RX ORDER — PROPRANOLOL HYDROCHLORIDE 60 MG/1
60 CAPSULE, EXTENDED RELEASE ORAL
Qty: 90 | Refills: 1 | Status: ACTIVE | COMMUNITY
Start: 2021-11-01 | End: 1900-01-01

## 2024-03-11 RX ORDER — LEVETIRACETAM 1000 MG/1
1000 TABLET, FILM COATED ORAL
Qty: 180 | Refills: 1 | Status: ACTIVE | COMMUNITY
Start: 2021-08-02 | End: 1900-01-01

## 2024-03-11 RX ORDER — PRIMIDONE 250 MG/1
250 TABLET ORAL
Qty: 270 | Refills: 1 | Status: ACTIVE | COMMUNITY
Start: 2021-06-03 | End: 1900-01-01

## 2024-03-11 NOTE — DISCUSSION/SUMMARY
[FreeTextEntry1] : Patient with Epilepsy (has a non-functioning VNS) and Chronic migraine headaches.  Epilepsy stable.   Continues to report headaches unchanged, approximately 1 time a week that respond to rest and OTC medication. Doing well at this time. If symptoms progress, consider intervention at that time.   - Educated that maintaining adequate diet and hydration will help improve headaches frequency and intensity.   -Continue meds as above -RPA in 4 months if stable   All questions and concerns were addressed to the best of my ability. Emotional support was rendered.

## 2024-03-11 NOTE — HISTORY OF PRESENT ILLNESS
[FreeTextEntry1] : Mr. NARANJO presents today for a follow up visit of Epilepsy (has a non-functioning VNS) and Muscle tension headaches.  Epilepsy remains stable. Continues to have infrequent episodes of memory lapses and losing train of thought, last known episode was 6 months ago. He remains compliant with medication regimen without side effects.   Continues to report B/L UE tremor that also remains stable. Continues to be on Primidone and Inderal.   Continues to report headaches approximately 1x week that can last 1-2 days at a time. Takes Excedrin  or Tylenol with mild relief and rests in a dark room. Patient has increased fluid intake. He continues to report excessive sleep. Patient is not currently working and does not participate in any hobbies or have a daily routine.   Of note, patient had a fall off a ladder in December. He was cutting branches off a tree about 11 feet high when he fell. There was no LOC prior to or after the fall. No seizure like activity reported. He did not sustain a head injury but he did have a left clavicular fracture and also fracture several bones in the ribs.

## 2024-03-11 NOTE — PHYSICAL EXAM
[Over the Past 2 Weeks, Have You Felt Down, Depressed, or Hopeless?] : 1.) Over the past 2 weeks, have you felt down, depressed, or hopeless? No [Over the Past 2 Weeks, Have You Felt Little Interest or Pleasure Doing Things?] : 2.) Over the past 2 weeks, have you felt little interest or pleasure doing things? No [Person] : oriented to person [Place] : oriented to place [Time] : oriented to time [Motor Strength] : muscle strength was normal in all four extremities [Motor Strength Upper Extremities Bilaterally] : strength was normal in both upper extremities [Motor Strength Lower Extremities Bilaterally] : strength was normal in both lower extremities [Tremor] : a tremor present [2+] : Ankle jerk left 2+ [FreeTextEntry8] : B/L UE FINE TREMOR NOTED WITH ACTION. NO CHANGES.

## 2024-09-16 ENCOUNTER — APPOINTMENT (OUTPATIENT)
Dept: NEUROLOGY | Facility: CLINIC | Age: 64
End: 2024-09-16
Payer: MEDICARE

## 2024-09-16 VITALS
DIASTOLIC BLOOD PRESSURE: 80 MMHG | WEIGHT: 152.38 LBS | HEIGHT: 68 IN | SYSTOLIC BLOOD PRESSURE: 132 MMHG | HEART RATE: 80 BPM | BODY MASS INDEX: 23.09 KG/M2

## 2024-09-16 DIAGNOSIS — G44.209 TENSION-TYPE HEADACHE, UNSPECIFIED, NOT INTRACTABLE: ICD-10-CM

## 2024-09-16 DIAGNOSIS — R56.9 UNSPECIFIED CONVULSIONS: ICD-10-CM

## 2024-09-16 PROCEDURE — 99213 OFFICE O/P EST LOW 20 MIN: CPT

## 2024-09-16 PROCEDURE — G2211 COMPLEX E/M VISIT ADD ON: CPT

## 2024-09-16 RX ORDER — MAGNESIUM OXIDE TAB 400 MG (241.3 MG ELEMENTAL MG) 400 (241.3 MG) MG
TAB ORAL
Refills: 0 | Status: ACTIVE | COMMUNITY

## 2024-09-16 NOTE — HISTORY OF PRESENT ILLNESS
[FreeTextEntry1] : Mr. NARANJO presents today for a follow up visit of Epilepsy (has a non-functioning VNS) and Muscle tension headaches.  Epilepsy remains stable. Continues to have infrequent episodes of memory lapses and losing train of thought, last known episode was last month but prior to this was 10 months ago. He remains compliant with medication regimen without side effects.   Continues to report B/L UE tremor that also remains stable. Continues to be on Primidone and Inderal.   Reporting headaches approximately 2x week that can last 1-2 days at a time. Takes Tylenol with mild relief and rests in a dark room. Patient has increased fluid intake. He continues to report excessive sleep. Patient is not currently working and does not participate in any hobbies or have a daily routine.

## 2024-09-16 NOTE — DISCUSSION/SUMMARY
[FreeTextEntry1] : Patient with Epilepsy (has a non-functioning VNS) and Chronic migraine headaches.  Epilepsy stable. Continues to report headaches unchanged, approximately 2 times a week that respond to rest and OTC medication. Doing well at this time. If symptoms progress, consider intervention at that time.   Plan:  - Educated that maintaining adequate diet and hydration will help improve headaches frequency and intensity.   -Continue meds as above -RPA in 6 months if stable   All questions and concerns were addressed to the best of my ability. Emotional support was rendered.

## 2024-09-16 NOTE — PHYSICAL EXAM
[Over the Past 2 Weeks, Have You Felt Down, Depressed, or Hopeless?] : 1.) Over the past 2 weeks, have you felt down, depressed, or hopeless? No [Over the Past 2 Weeks, Have You Felt Little Interest or Pleasure Doing Things?] : 2.) Over the past 2 weeks, have you felt little interest or pleasure doing things? No [Person] : oriented to person [Place] : oriented to place [Time] : oriented to time [Motor Strength Upper Extremities Bilaterally] : strength was normal in both upper extremities [Motor Strength] : muscle strength was normal in all four extremities [Motor Strength Lower Extremities Bilaterally] : strength was normal in both lower extremities [Tremor] : a tremor present [2+] : Ankle jerk left 2+ [FreeTextEntry8] : B/L UE FINE TREMOR NOTED WITH ACTION. NO CHANGES.

## 2025-01-16 ENCOUNTER — APPOINTMENT (OUTPATIENT)
Dept: ORTHOPEDIC SURGERY | Facility: CLINIC | Age: 65
End: 2025-01-16
Payer: MEDICARE

## 2025-01-16 DIAGNOSIS — M72.0 PALMAR FASCIAL FIBROMATOSIS [DUPUYTREN]: ICD-10-CM

## 2025-01-16 PROCEDURE — 99213 OFFICE O/P EST LOW 20 MIN: CPT

## 2025-02-10 ENCOUNTER — APPOINTMENT (OUTPATIENT)
Dept: ORTHOPEDIC SURGERY | Facility: CLINIC | Age: 65
End: 2025-02-10
Payer: MEDICARE

## 2025-02-10 DIAGNOSIS — M72.0 PALMAR FASCIAL FIBROMATOSIS [DUPUYTREN]: ICD-10-CM

## 2025-02-10 PROCEDURE — 99213 OFFICE O/P EST LOW 20 MIN: CPT

## 2025-03-10 ENCOUNTER — OUTPATIENT (OUTPATIENT)
Dept: OUTPATIENT SERVICES | Facility: HOSPITAL | Age: 65
LOS: 1 days | End: 2025-03-10
Payer: MEDICARE

## 2025-03-10 VITALS
RESPIRATION RATE: 18 BRPM | SYSTOLIC BLOOD PRESSURE: 120 MMHG | DIASTOLIC BLOOD PRESSURE: 78 MMHG | TEMPERATURE: 98 F | WEIGHT: 160.06 LBS | OXYGEN SATURATION: 100 % | HEART RATE: 79 BPM | HEIGHT: 68 IN

## 2025-03-10 DIAGNOSIS — M72.0 PALMAR FASCIAL FIBROMATOSIS [DUPUYTREN]: ICD-10-CM

## 2025-03-10 DIAGNOSIS — Z98.890 OTHER SPECIFIED POSTPROCEDURAL STATES: Chronic | ICD-10-CM

## 2025-03-10 DIAGNOSIS — Z01.818 ENCOUNTER FOR OTHER PREPROCEDURAL EXAMINATION: ICD-10-CM

## 2025-03-10 LAB
ALBUMIN SERPL ELPH-MCNC: 4.4 G/DL — SIGNIFICANT CHANGE UP (ref 3.5–5.2)
ALP SERPL-CCNC: 70 U/L — SIGNIFICANT CHANGE UP (ref 30–115)
ALT FLD-CCNC: 18 U/L — SIGNIFICANT CHANGE UP (ref 0–41)
ANION GAP SERPL CALC-SCNC: 8 MMOL/L — SIGNIFICANT CHANGE UP (ref 7–14)
AST SERPL-CCNC: 18 U/L — SIGNIFICANT CHANGE UP (ref 0–41)
BASOPHILS # BLD AUTO: 0.02 K/UL — SIGNIFICANT CHANGE UP (ref 0–0.2)
BASOPHILS NFR BLD AUTO: 0.4 % — SIGNIFICANT CHANGE UP (ref 0–1)
BILIRUB SERPL-MCNC: 0.3 MG/DL — SIGNIFICANT CHANGE UP (ref 0.2–1.2)
BUN SERPL-MCNC: 21 MG/DL — HIGH (ref 10–20)
CALCIUM SERPL-MCNC: 9.6 MG/DL — SIGNIFICANT CHANGE UP (ref 8.4–10.5)
CHLORIDE SERPL-SCNC: 100 MMOL/L — SIGNIFICANT CHANGE UP (ref 98–110)
CO2 SERPL-SCNC: 28 MMOL/L — SIGNIFICANT CHANGE UP (ref 17–32)
CREAT SERPL-MCNC: 0.8 MG/DL — SIGNIFICANT CHANGE UP (ref 0.7–1.5)
EGFR: 99 ML/MIN/1.73M2 — SIGNIFICANT CHANGE UP
EGFR: 99 ML/MIN/1.73M2 — SIGNIFICANT CHANGE UP
EOSINOPHIL # BLD AUTO: 0.04 K/UL — SIGNIFICANT CHANGE UP (ref 0–0.7)
EOSINOPHIL NFR BLD AUTO: 0.8 % — SIGNIFICANT CHANGE UP (ref 0–8)
GLUCOSE SERPL-MCNC: 94 MG/DL — SIGNIFICANT CHANGE UP (ref 70–99)
HCT VFR BLD CALC: 41.1 % — LOW (ref 42–52)
HGB BLD-MCNC: 13.8 G/DL — LOW (ref 14–18)
IMM GRANULOCYTES NFR BLD AUTO: 0.2 % — SIGNIFICANT CHANGE UP (ref 0.1–0.3)
LYMPHOCYTES # BLD AUTO: 1.29 K/UL — SIGNIFICANT CHANGE UP (ref 1.2–3.4)
LYMPHOCYTES # BLD AUTO: 26.9 % — SIGNIFICANT CHANGE UP (ref 20.5–51.1)
MCHC RBC-ENTMCNC: 30.1 PG — SIGNIFICANT CHANGE UP (ref 27–31)
MCHC RBC-ENTMCNC: 33.6 G/DL — SIGNIFICANT CHANGE UP (ref 32–37)
MCV RBC AUTO: 89.5 FL — SIGNIFICANT CHANGE UP (ref 80–94)
MONOCYTES # BLD AUTO: 0.44 K/UL — SIGNIFICANT CHANGE UP (ref 0.1–0.6)
MONOCYTES NFR BLD AUTO: 9.2 % — SIGNIFICANT CHANGE UP (ref 1.7–9.3)
NEUTROPHILS # BLD AUTO: 3 K/UL — SIGNIFICANT CHANGE UP (ref 1.4–6.5)
NEUTROPHILS NFR BLD AUTO: 62.5 % — SIGNIFICANT CHANGE UP (ref 42.2–75.2)
NRBC BLD AUTO-RTO: 0 /100 WBCS — SIGNIFICANT CHANGE UP (ref 0–0)
PLATELET # BLD AUTO: 189 K/UL — SIGNIFICANT CHANGE UP (ref 130–400)
PMV BLD: 11.6 FL — HIGH (ref 7.4–10.4)
POTASSIUM SERPL-MCNC: 4.6 MMOL/L — SIGNIFICANT CHANGE UP (ref 3.5–5)
POTASSIUM SERPL-SCNC: 4.6 MMOL/L — SIGNIFICANT CHANGE UP (ref 3.5–5)
PROT SERPL-MCNC: 6.5 G/DL — SIGNIFICANT CHANGE UP (ref 6–8)
RBC # BLD: 4.59 M/UL — LOW (ref 4.7–6.1)
RBC # FLD: 13.9 % — SIGNIFICANT CHANGE UP (ref 11.5–14.5)
SODIUM SERPL-SCNC: 136 MMOL/L — SIGNIFICANT CHANGE UP (ref 135–146)
WBC # BLD: 4.8 K/UL — SIGNIFICANT CHANGE UP (ref 4.8–10.8)
WBC # FLD AUTO: 4.8 K/UL — SIGNIFICANT CHANGE UP (ref 4.8–10.8)

## 2025-03-10 PROCEDURE — 36415 COLL VENOUS BLD VENIPUNCTURE: CPT

## 2025-03-10 PROCEDURE — 80053 COMPREHEN METABOLIC PANEL: CPT

## 2025-03-10 PROCEDURE — 93010 ELECTROCARDIOGRAM REPORT: CPT

## 2025-03-10 PROCEDURE — 93005 ELECTROCARDIOGRAM TRACING: CPT

## 2025-03-10 PROCEDURE — 99214 OFFICE O/P EST MOD 30 MIN: CPT | Mod: 25

## 2025-03-10 PROCEDURE — 85025 COMPLETE CBC W/AUTO DIFF WBC: CPT

## 2025-03-10 NOTE — H&P PST ADULT - HISTORY OF PRESENT ILLNESS
pt with right hand/ fingers contracture    PATIENT/GUARDIAN CURRENTLY DENIES CHEST PAIN  SHORTNESS OF BREATH  PALPITATIONS,  DYSURIA, OR UPPER RESPIRATORY INFECTION IN PAST 2 WEEKS  denies travel outside the [place] in the past 30 days    Anesthesia Alert  NO--Difficult Airway  NO--History of neck surgery or radiation  NO--Limited ROM of neck  NO--History of Malignant hyperthermia  NO--No personal or family history of Pseudocholinesterase deficiency.  NO--Prior Anesthesia Complication  NO--Latex Allergy  NO--Loose teeth  NO--History of Rheumatoid Arthritis  NO--Bleeding risk  NO--REBECCA  last seizure  5 months ago  short term memory loss    PT/GUARDIAN DENIES ANY RASHES, ABRASION, OR OPEN WOUNDS OR CUTS    AS PER THE PT/GUARDIAN, THIS IS HIS/HER COMPLETE MEDICAL AND SURGICAL HX, INCLUDING MEDICATIONS PRESCRIBED AND OVER THE COUNTER    Patient/guardian understands the instructions and was given the opportunity to ask questions and have them answered.    pt/guardian denies any suicidal ideation or thoughts, pt states not a threat to self or others      Duke Activity Status Index (DASI)  44.7 points  The higher the score (maximum 58.2), the higher the functional status.  8.23 METs    Revised Cardiac Risk Index for Pre-Operative Risk  0 points  RCRI Score  3.9 %  Risk of major cardiac event

## 2025-03-10 NOTE — H&P PST ADULT - REASON FOR ADMISSION
Patient is a 64  year old  male presenting to [facility] in preparation for RIGHT HAND PARTIAL PALMAR FOURTH AND FIFTH DIGIT FASCIECTOMY  on  3/25/25 under regional anesthesia by Dr. Allen

## 2025-03-10 NOTE — H&P PST ADULT - NSICDXPASTMEDICALHX_GEN_ALL_CORE_FT
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

## 2025-03-11 DIAGNOSIS — M72.0 PALMAR FASCIAL FIBROMATOSIS [DUPUYTREN]: ICD-10-CM

## 2025-03-11 DIAGNOSIS — Z01.818 ENCOUNTER FOR OTHER PREPROCEDURAL EXAMINATION: ICD-10-CM

## 2025-03-17 ENCOUNTER — APPOINTMENT (OUTPATIENT)
Dept: NEUROLOGY | Facility: CLINIC | Age: 65
End: 2025-03-17

## 2025-03-17 PROBLEM — R25.1 TREMOR, UNSPECIFIED: Chronic | Status: ACTIVE | Noted: 2025-03-10

## 2025-03-19 DIAGNOSIS — R56.9 UNSPECIFIED CONVULSIONS: ICD-10-CM

## 2025-03-22 LAB — VALPROATE SERPL-MCNC: 63 UG/ML

## 2025-03-24 ENCOUNTER — NON-APPOINTMENT (OUTPATIENT)
Age: 65
End: 2025-03-24

## 2025-03-24 LAB — LEVETIRACETAM SERPL-MCNC: 21.2 UG/ML

## 2025-03-25 ENCOUNTER — TRANSCRIPTION ENCOUNTER (OUTPATIENT)
Age: 65
End: 2025-03-25

## 2025-03-25 ENCOUNTER — RESULT REVIEW (OUTPATIENT)
Age: 65
End: 2025-03-25

## 2025-03-25 ENCOUNTER — APPOINTMENT (OUTPATIENT)
Dept: ORTHOPEDIC SURGERY | Facility: HOSPITAL | Age: 65
End: 2025-03-25

## 2025-03-25 ENCOUNTER — OUTPATIENT (OUTPATIENT)
Dept: OUTPATIENT SERVICES | Facility: HOSPITAL | Age: 65
LOS: 1 days | Discharge: HOME | End: 2025-03-25
Payer: MEDICARE

## 2025-03-25 VITALS
HEIGHT: 68 IN | DIASTOLIC BLOOD PRESSURE: 66 MMHG | WEIGHT: 164.91 LBS | HEART RATE: 74 BPM | TEMPERATURE: 98 F | RESPIRATION RATE: 17 BRPM | SYSTOLIC BLOOD PRESSURE: 112 MMHG | OXYGEN SATURATION: 96 %

## 2025-03-25 VITALS
RESPIRATION RATE: 17 BRPM | DIASTOLIC BLOOD PRESSURE: 84 MMHG | HEIGHT: 68 IN | WEIGHT: 164.91 LBS | OXYGEN SATURATION: 97 % | HEART RATE: 80 BPM | TEMPERATURE: 97 F | SYSTOLIC BLOOD PRESSURE: 118 MMHG

## 2025-03-25 DIAGNOSIS — M72.0 PALMAR FASCIAL FIBROMATOSIS [DUPUYTREN]: ICD-10-CM

## 2025-03-25 DIAGNOSIS — Z98.890 OTHER SPECIFIED POSTPROCEDURAL STATES: Chronic | ICD-10-CM

## 2025-03-25 PROCEDURE — 88304 TISSUE EXAM BY PATHOLOGIST: CPT

## 2025-03-25 PROCEDURE — 88304 TISSUE EXAM BY PATHOLOGIST: CPT | Mod: 26

## 2025-03-25 PROCEDURE — 26123 RELEASE PALM CONTRACTURE: CPT | Mod: F8

## 2025-03-25 PROCEDURE — 26125 RELEASE PALM CONTRACTURE: CPT | Mod: F9

## 2025-03-25 RX ORDER — OXYCODONE HYDROCHLORIDE AND ACETAMINOPHEN 10; 325 MG/1; MG/1
1 TABLET ORAL
Qty: 20 | Refills: 0
Start: 2025-03-25 | End: 2025-03-29

## 2025-03-25 RX ORDER — IBUPROFEN 200 MG
1 TABLET ORAL
Qty: 20 | Refills: 0
Start: 2025-03-25

## 2025-03-25 RX ORDER — ONDANSETRON HCL/PF 4 MG/2 ML
4 VIAL (ML) INJECTION ONCE
Refills: 0 | Status: DISCONTINUED | OUTPATIENT
Start: 2025-03-25 | End: 2025-03-25

## 2025-03-25 RX ORDER — HYDROMORPHONE/SOD CHLOR,ISO/PF 2 MG/10 ML
0.5 SYRINGE (ML) INJECTION
Refills: 0 | Status: DISCONTINUED | OUTPATIENT
Start: 2025-03-25 | End: 2025-03-25

## 2025-03-25 RX ORDER — SODIUM CHLORIDE 9 G/1000ML
1000 INJECTION, SOLUTION INTRAVENOUS
Refills: 0 | Status: DISCONTINUED | OUTPATIENT
Start: 2025-03-25 | End: 2025-03-25

## 2025-03-25 RX ORDER — PROPRANOLOL HCL 60 MG
1 TABLET ORAL
Refills: 0 | DISCHARGE

## 2025-03-25 RX ORDER — LEVETIRACETAM 10 MG/ML
1 INJECTION, SOLUTION INTRAVENOUS
Refills: 0 | DISCHARGE

## 2025-03-25 NOTE — ASU PATIENT PROFILE, ADULT - FALL HARM RISK - HARM RISK INTERVENTIONS

## 2025-03-25 NOTE — BRIEF OPERATIVE NOTE - NSICDXBRIEFPROCEDURE_GEN_ALL_CORE_FT
PROCEDURES:  Fasciectomy, palm, partial, with finger IP joint release 25-Mar-2025 14:13:34  All Allen

## 2025-03-25 NOTE — CHART NOTE - NSCHARTNOTEFT_GEN_A_CORE
PACU ANESTHESIA ADMISSION NOTE      Procedure: Fasciectomy, palm, partial, with finger IP joint release      Post op diagnosis:  Dupuytren contracture        ____  Intubated  TV:______       Rate: ______      FiO2: ______    __x__  Patent Airway    ___x_  Full return of protective reflexes    __x__  Full recovery from anesthesia / back to baseline     Vitals:   T: 97.1          R: 16               BP:  114/41             Sat: 96%                   P: 89      Mental Status:  __x__ Awake   ___x__ Alert   _____ Drowsy   _____ Sedated    Nausea/Vomiting:  __x__ NO  ______Yes,   See Post - Op Orders          Pain Scale (0-10):  _____    Treatment: ____ None    __x__ See Post - Op/PCA Orders    Post - Operative Fluids:   ____ Oral   __x__ See Post - Op Orders    Plan: Discharge:   __x__Home       _____Floor     _____Critical Care    _____  Other:_________________

## 2025-03-25 NOTE — ASU PATIENT PROFILE, ADULT - PATIENT'S GENDER IDENTITY
Subjective   Michi Mckinley is a 70 y.o. 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  Bleeding signs/symptoms:     Major bleeding event:    Thrombosis signs/symptoms:    Thromboembolic event:      Missed doses:     Medication changes:     Dietary changes:    Bacterial/viral infection: no  Other concerns:      Review of Systems   All other systems reviewed and are negative.      Objective   Current warfarin (COUMADIN) dose: 4mg daily   Lab Results   Component Value Date    INR 3.3 (A) 02/08/2024    INR 2.6 (A) 01/08/2024    INR 3.0 (A) 12/08/2023       Assessment/Plan    Therapeutic INR for goal of The patient does not have an active anticoagulation episode.    Dose: no change  Next INR: 1 month  
Subjective   Patient ID: Michi Mckinley is a 70 y.o. male who presents for INR (INR 3.3/Dose 4mg ).    HPI     Review of Systems    Objective   /78   Pulse 66     Physical Exam    Assessment/Plan          
Male

## 2025-03-25 NOTE — ASU DISCHARGE PLAN (ADULT/PEDIATRIC) - FINANCIAL ASSISTANCE
[facility] provides services at a reduced cost to those who are determined to be eligible through [facility]’s financial assistance program. Information regarding [facility]’s financial assistance program can be found by going to [email or web address] or by calling [phone].

## 2025-03-25 NOTE — ASU PATIENT PROFILE, ADULT - NSICDXPASTMEDICALHX_GEN_ALL_CORE_FT
PAST MEDICAL HISTORY:  H/O viral encephalitis 22 yrs old- cause of seizure disorder    History of shingles denies    Mild HTN denies    Seizures HAS SEIZURES 4-5 TIMES A MONTH USUALLY EFFECTING SPEECH NOT TONICV CLONIC    Tremor of both hands

## 2025-03-28 LAB — SURGICAL PATHOLOGY STUDY: SIGNIFICANT CHANGE UP

## 2025-04-02 DIAGNOSIS — M72.0 PALMAR FASCIAL FIBROMATOSIS [DUPUYTREN]: ICD-10-CM

## 2025-04-02 DIAGNOSIS — R25.1 TREMOR, UNSPECIFIED: ICD-10-CM

## 2025-04-02 DIAGNOSIS — Z86.61 PERSONAL HISTORY OF INFECTIONS OF THE CENTRAL NERVOUS SYSTEM: ICD-10-CM

## 2025-04-02 DIAGNOSIS — I10 ESSENTIAL (PRIMARY) HYPERTENSION: ICD-10-CM

## 2025-04-02 DIAGNOSIS — G40.909 EPILEPSY, UNSPECIFIED, NOT INTRACTABLE, WITHOUT STATUS EPILEPTICUS: ICD-10-CM

## 2025-04-03 ENCOUNTER — APPOINTMENT (OUTPATIENT)
Dept: NEUROLOGY | Facility: CLINIC | Age: 65
End: 2025-04-03
Payer: MEDICARE

## 2025-04-03 ENCOUNTER — NON-APPOINTMENT (OUTPATIENT)
Age: 65
End: 2025-04-03

## 2025-04-03 ENCOUNTER — APPOINTMENT (OUTPATIENT)
Dept: ORTHOPEDIC SURGERY | Facility: CLINIC | Age: 65
End: 2025-04-03
Payer: MEDICARE

## 2025-04-03 VITALS
SYSTOLIC BLOOD PRESSURE: 128 MMHG | BODY MASS INDEX: 23.49 KG/M2 | HEART RATE: 81 BPM | OXYGEN SATURATION: 98 % | HEIGHT: 68 IN | DIASTOLIC BLOOD PRESSURE: 78 MMHG | WEIGHT: 155 LBS

## 2025-04-03 DIAGNOSIS — M72.0 PALMAR FASCIAL FIBROMATOSIS [DUPUYTREN]: ICD-10-CM

## 2025-04-03 DIAGNOSIS — R56.9 UNSPECIFIED CONVULSIONS: ICD-10-CM

## 2025-04-03 PROBLEM — I10 ESSENTIAL (PRIMARY) HYPERTENSION: Chronic | Status: ACTIVE | Noted: 2025-03-10

## 2025-04-03 PROCEDURE — 99214 OFFICE O/P EST MOD 30 MIN: CPT

## 2025-04-03 PROCEDURE — G2211 COMPLEX E/M VISIT ADD ON: CPT | Mod: NC

## 2025-04-03 PROCEDURE — 99024 POSTOP FOLLOW-UP VISIT: CPT

## 2025-04-11 ENCOUNTER — NON-APPOINTMENT (OUTPATIENT)
Age: 65
End: 2025-04-11

## 2025-04-28 ENCOUNTER — APPOINTMENT (OUTPATIENT)
Dept: ORTHOPEDIC SURGERY | Facility: CLINIC | Age: 65
End: 2025-04-28
Payer: MEDICARE

## 2025-04-28 DIAGNOSIS — M72.0 PALMAR FASCIAL FIBROMATOSIS [DUPUYTREN]: ICD-10-CM

## 2025-04-28 PROBLEM — Z86.19 PERSONAL HISTORY OF OTHER INFECTIOUS AND PARASITIC DISEASES: Chronic | Status: ACTIVE | Noted: 2025-03-10

## 2025-04-28 PROCEDURE — 99024 POSTOP FOLLOW-UP VISIT: CPT

## 2025-06-11 ENCOUNTER — APPOINTMENT (OUTPATIENT)
Dept: ORTHOPEDIC SURGERY | Facility: CLINIC | Age: 65
End: 2025-06-11
Payer: MEDICARE

## 2025-06-11 PROBLEM — M65.331 TRIGGER MIDDLE FINGER OF RIGHT HAND: Status: ACTIVE | Noted: 2025-06-11

## 2025-06-11 PROCEDURE — 20550 NJX 1 TENDON SHEATH/LIGAMENT: CPT

## 2025-06-11 PROCEDURE — 99024 POSTOP FOLLOW-UP VISIT: CPT

## 2025-06-11 NOTE — PRE-ANESTHESIA EVALUATION ADULT - NSANTHOSAYNRD_GEN_A_CORE
[FreeTextEntry1] : Problems Assessed: 1. Dyspnea on exertion 2.Chest pain due to suspected CAD 3. HTN 4. HLD Plan: - CTCA for further assessment - Event monitor x7 days. Follow-up: 1 month  -----------------------------------------------------------------------------------------   No. REBECCA screening performed.  STOP BANG Legend: 0-2 = LOW Risk; 3-4 = INTERMEDIATE Risk; 5-8 = HIGH Risk

## 2025-07-01 ENCOUNTER — RX RENEWAL (OUTPATIENT)
Age: 65
End: 2025-07-01

## 2025-07-12 ENCOUNTER — EMERGENCY (EMERGENCY)
Facility: HOSPITAL | Age: 65
LOS: 0 days | Discharge: HOME | End: 2025-07-12
Attending: EMERGENCY MEDICINE
Payer: MEDICARE

## 2025-07-12 VITALS
RESPIRATION RATE: 18 BRPM | SYSTOLIC BLOOD PRESSURE: 117 MMHG | DIASTOLIC BLOOD PRESSURE: 80 MMHG | TEMPERATURE: 97 F | OXYGEN SATURATION: 96 % | HEART RATE: 64 BPM

## 2025-07-12 VITALS
TEMPERATURE: 98 F | DIASTOLIC BLOOD PRESSURE: 73 MMHG | HEART RATE: 77 BPM | OXYGEN SATURATION: 96 % | RESPIRATION RATE: 16 BRPM | SYSTOLIC BLOOD PRESSURE: 109 MMHG

## 2025-07-12 DIAGNOSIS — Z87.820 PERSONAL HISTORY OF TRAUMATIC BRAIN INJURY: ICD-10-CM

## 2025-07-12 DIAGNOSIS — G40.909 EPILEPSY, UNSPECIFIED, NOT INTRACTABLE, WITHOUT STATUS EPILEPTICUS: ICD-10-CM

## 2025-07-12 DIAGNOSIS — U07.1 COVID-19: ICD-10-CM

## 2025-07-12 DIAGNOSIS — Z98.890 OTHER SPECIFIED POSTPROCEDURAL STATES: Chronic | ICD-10-CM

## 2025-07-12 DIAGNOSIS — R42 DIZZINESS AND GIDDINESS: ICD-10-CM

## 2025-07-12 LAB
ALBUMIN SERPL ELPH-MCNC: 4.1 G/DL — SIGNIFICANT CHANGE UP (ref 3.5–5.2)
ALP SERPL-CCNC: 69 U/L — SIGNIFICANT CHANGE UP (ref 30–115)
ALT FLD-CCNC: 16 U/L — SIGNIFICANT CHANGE UP (ref 0–41)
ANION GAP SERPL CALC-SCNC: 13 MMOL/L — SIGNIFICANT CHANGE UP (ref 7–14)
APPEARANCE UR: CLEAR — SIGNIFICANT CHANGE UP
APTT BLD: 34 SEC — SIGNIFICANT CHANGE UP (ref 27–39.2)
AST SERPL-CCNC: 22 U/L — SIGNIFICANT CHANGE UP (ref 0–41)
BACTERIA # UR AUTO: ABNORMAL /HPF
BASE EXCESS BLDV CALC-SCNC: 15.7 MMOL/L — HIGH (ref -2–3)
BASOPHILS # BLD AUTO: 0.02 K/UL — SIGNIFICANT CHANGE UP (ref 0–0.2)
BASOPHILS # BLD MANUAL: 0 K/UL — SIGNIFICANT CHANGE UP (ref 0–0.2)
BASOPHILS NFR BLD AUTO: 0.5 % — SIGNIFICANT CHANGE UP (ref 0–2)
BASOPHILS NFR BLD MANUAL: 0 % — SIGNIFICANT CHANGE UP (ref 0–2)
BILIRUB SERPL-MCNC: 0.2 MG/DL — SIGNIFICANT CHANGE UP (ref 0.2–1.2)
BILIRUB UR-MCNC: ABNORMAL
BUN SERPL-MCNC: 12 MG/DL — SIGNIFICANT CHANGE UP (ref 10–20)
CA-I SERPL-SCNC: 1.24 MMOL/L — SIGNIFICANT CHANGE UP (ref 1.15–1.33)
CALCIUM SERPL-MCNC: 9.8 MG/DL — SIGNIFICANT CHANGE UP (ref 8.4–10.5)
CHLORIDE SERPL-SCNC: 96 MMOL/L — LOW (ref 98–110)
CO2 SERPL-SCNC: 32 MMOL/L — SIGNIFICANT CHANGE UP (ref 17–32)
COLOR SPEC: SIGNIFICANT CHANGE UP
CREAT SERPL-MCNC: 0.7 MG/DL — SIGNIFICANT CHANGE UP (ref 0.7–1.5)
DIFF PNL FLD: NEGATIVE — SIGNIFICANT CHANGE UP
EGFR: 103 ML/MIN/1.73M2 — SIGNIFICANT CHANGE UP
EGFR: 103 ML/MIN/1.73M2 — SIGNIFICANT CHANGE UP
EOSINOPHIL # BLD AUTO: 0.03 K/UL — SIGNIFICANT CHANGE UP (ref 0–0.5)
EOSINOPHIL # BLD MANUAL: 0 K/UL — SIGNIFICANT CHANGE UP (ref 0–0.5)
EOSINOPHIL NFR BLD AUTO: 0.8 % — SIGNIFICANT CHANGE UP (ref 0–6)
EOSINOPHIL NFR BLD MANUAL: 0 % — SIGNIFICANT CHANGE UP (ref 0–6)
EPI CELLS # UR: PRESENT
FLUAV AG NPH QL: SIGNIFICANT CHANGE UP
FLUBV AG NPH QL: SIGNIFICANT CHANGE UP
GAS PNL BLDV: 138 MMOL/L — SIGNIFICANT CHANGE UP (ref 136–145)
GAS PNL BLDV: SIGNIFICANT CHANGE UP
GIANT PLATELETS BLD QL SMEAR: PRESENT
GLUCOSE SERPL-MCNC: 91 MG/DL — SIGNIFICANT CHANGE UP (ref 70–99)
GLUCOSE UR QL: NEGATIVE MG/DL — SIGNIFICANT CHANGE UP
HCO3 BLDV-SCNC: 43 MMOL/L — HIGH (ref 22–29)
HCT VFR BLD CALC: 40.2 % — SIGNIFICANT CHANGE UP (ref 39–50)
HCT VFR BLDA CALC: 51 % — SIGNIFICANT CHANGE UP (ref 39–51)
HGB BLD CALC-MCNC: 16.9 G/DL — SIGNIFICANT CHANGE UP (ref 12.6–17.4)
HGB BLD-MCNC: 13.3 G/DL — SIGNIFICANT CHANGE UP (ref 13–17)
IMM GRANULOCYTES # BLD AUTO: 0.01 K/UL — SIGNIFICANT CHANGE UP (ref 0–0.07)
IMM GRANULOCYTES NFR BLD AUTO: 0.3 % — SIGNIFICANT CHANGE UP (ref 0–0.9)
INR BLD: 0.93 RATIO — SIGNIFICANT CHANGE UP (ref 0.65–1.3)
KETONES UR QL: 40 MG/DL
LACTATE BLDV-MCNC: 1.5 MMOL/L — SIGNIFICANT CHANGE UP (ref 0.5–2)
LEUKOCYTE ESTERASE UR-ACNC: ABNORMAL
LYMPHOCYTES # BLD AUTO: 1.43 K/UL — SIGNIFICANT CHANGE UP (ref 1–3.3)
LYMPHOCYTES # BLD MANUAL: 1.64 K/UL — SIGNIFICANT CHANGE UP (ref 1–3.3)
LYMPHOCYTES NFR BLD AUTO: 36.2 % — SIGNIFICANT CHANGE UP (ref 13–44)
LYMPHOCYTES NFR BLD MANUAL: 41.4 % — SIGNIFICANT CHANGE UP (ref 13–44)
MAGNESIUM SERPL-MCNC: 2.4 MG/DL — SIGNIFICANT CHANGE UP (ref 1.8–2.4)
MANUAL NRBC #: 0.04 K/UL — HIGH (ref 0–0)
MANUAL REACTIVE LYMPHOCYTES #: 0.13 K/UL — SIGNIFICANT CHANGE UP (ref 0–0.63)
MCHC RBC-ENTMCNC: 29 PG — SIGNIFICANT CHANGE UP (ref 27–34)
MCHC RBC-ENTMCNC: 33.1 G/DL — SIGNIFICANT CHANGE UP (ref 32–36)
MCV RBC AUTO: 87.8 FL — SIGNIFICANT CHANGE UP (ref 80–100)
MICROCYTES BLD QL: SLIGHT — SIGNIFICANT CHANGE UP
MONOCYTES # BLD AUTO: 0.33 K/UL — SIGNIFICANT CHANGE UP (ref 0–0.9)
MONOCYTES # BLD MANUAL: 0.13 K/UL — SIGNIFICANT CHANGE UP (ref 0–0.9)
MONOCYTES NFR BLD AUTO: 8.4 % — SIGNIFICANT CHANGE UP (ref 2–14)
MONOCYTES NFR BLD MANUAL: 3.4 % — SIGNIFICANT CHANGE UP (ref 2–14)
MUCOUS THREADS # UR AUTO: PRESENT
NEUTROPHILS # BLD AUTO: 2.13 K/UL — SIGNIFICANT CHANGE UP (ref 1.8–7.4)
NEUTROPHILS # BLD MANUAL: 2.05 K/UL — SIGNIFICANT CHANGE UP (ref 1.8–7.4)
NEUTROPHILS NFR BLD AUTO: 53.8 % — SIGNIFICANT CHANGE UP (ref 43–77)
NEUTROPHILS NFR BLD MANUAL: 51.8 % — SIGNIFICANT CHANGE UP (ref 43–77)
NITRITE UR-MCNC: NEGATIVE — SIGNIFICANT CHANGE UP
NRBC # BLD AUTO: 0 K/UL — SIGNIFICANT CHANGE UP (ref 0–0)
NRBC # BLD: 1 /100 WBCS — HIGH (ref 0–0)
NRBC # FLD: 0 K/UL — SIGNIFICANT CHANGE UP (ref 0–0)
NRBC BLD AUTO-RTO: 0 /100 WBCS — SIGNIFICANT CHANGE UP (ref 0–0)
NRBC BLD-RTO: 1 /100 WBCS — HIGH (ref 0–0)
OVALOCYTES BLD QL SMEAR: SLIGHT — SIGNIFICANT CHANGE UP
PCO2 BLDV: 61 MMHG — HIGH (ref 42–55)
PH BLDV: 7.46 — HIGH (ref 7.32–7.43)
PH UR: 7 — SIGNIFICANT CHANGE UP (ref 5–8)
PLAT MORPH BLD: NORMAL — SIGNIFICANT CHANGE UP
PLATELET # BLD AUTO: 147 K/UL — LOW (ref 150–400)
PMV BLD: 10.2 FL — SIGNIFICANT CHANGE UP (ref 7–13)
PO2 BLDV: 14 MMHG — LOW (ref 25–45)
POTASSIUM BLDV-SCNC: 3.5 MMOL/L — SIGNIFICANT CHANGE UP (ref 3.5–5.1)
POTASSIUM SERPL-MCNC: 3.7 MMOL/L — SIGNIFICANT CHANGE UP (ref 3.5–5)
POTASSIUM SERPL-SCNC: 3.7 MMOL/L — SIGNIFICANT CHANGE UP (ref 3.5–5)
PROT SERPL-MCNC: 7.2 G/DL — SIGNIFICANT CHANGE UP (ref 6–8)
PROT UR-MCNC: 30 MG/DL
PROTHROM AB SERPL-ACNC: 11 SEC — SIGNIFICANT CHANGE UP (ref 9.95–12.87)
RBC # BLD: 4.58 M/UL — SIGNIFICANT CHANGE UP (ref 4.2–5.8)
RBC # FLD: 14.4 % — SIGNIFICANT CHANGE UP (ref 10.3–14.5)
RBC BLD AUTO: NORMAL — SIGNIFICANT CHANGE UP
RBC CASTS # UR COMP ASSIST: 3 /HPF — SIGNIFICANT CHANGE UP (ref 0–4)
RSV RNA NPH QL NAA+NON-PROBE: SIGNIFICANT CHANGE UP
SAO2 % BLDV: 14.3 % — LOW (ref 67–88)
SARS-COV-2 RNA SPEC QL NAA+PROBE: DETECTED
SMUDGE CELLS # BLD: PRESENT
SODIUM SERPL-SCNC: 141 MMOL/L — SIGNIFICANT CHANGE UP (ref 135–146)
SOURCE RESPIRATORY: SIGNIFICANT CHANGE UP
SP GR SPEC: >1.03 — HIGH (ref 1–1.03)
SQUAMOUS # UR AUTO: 4 /HPF — SIGNIFICANT CHANGE UP (ref 0–5)
UROBILINOGEN FLD QL: 1 MG/DL — SIGNIFICANT CHANGE UP (ref 0.2–1)
VALPROATE SERPL-MCNC: 75 UG/ML — SIGNIFICANT CHANGE UP (ref 50–100)
VARIANT LYMPHS # BLD: 3.4 % — SIGNIFICANT CHANGE UP (ref 0–6)
VARIANT LYMPHS NFR BLD MANUAL: 3.4 % — SIGNIFICANT CHANGE UP (ref 0–6)
WBC # BLD: 3.95 K/UL — SIGNIFICANT CHANGE UP (ref 3.8–10.5)
WBC # FLD AUTO: 3.95 K/UL — SIGNIFICANT CHANGE UP (ref 3.8–10.5)
WBC UR QL: 5 /HPF — SIGNIFICANT CHANGE UP (ref 0–5)

## 2025-07-12 PROCEDURE — 85730 THROMBOPLASTIN TIME PARTIAL: CPT

## 2025-07-12 PROCEDURE — 70450 CT HEAD/BRAIN W/O DYE: CPT | Mod: 26,XU

## 2025-07-12 PROCEDURE — 80053 COMPREHEN METABOLIC PANEL: CPT

## 2025-07-12 PROCEDURE — 81001 URINALYSIS AUTO W/SCOPE: CPT

## 2025-07-12 PROCEDURE — 36415 COLL VENOUS BLD VENIPUNCTURE: CPT

## 2025-07-12 PROCEDURE — 84295 ASSAY OF SERUM SODIUM: CPT

## 2025-07-12 PROCEDURE — 85025 COMPLETE CBC W/AUTO DIFF WBC: CPT

## 2025-07-12 PROCEDURE — 84132 ASSAY OF SERUM POTASSIUM: CPT

## 2025-07-12 PROCEDURE — 70498 CT ANGIOGRAPHY NECK: CPT | Mod: 26

## 2025-07-12 PROCEDURE — 71045 X-RAY EXAM CHEST 1 VIEW: CPT | Mod: 26

## 2025-07-12 PROCEDURE — 85014 HEMATOCRIT: CPT

## 2025-07-12 PROCEDURE — 70496 CT ANGIOGRAPHY HEAD: CPT

## 2025-07-12 PROCEDURE — 70496 CT ANGIOGRAPHY HEAD: CPT | Mod: 26

## 2025-07-12 PROCEDURE — 80164 ASSAY DIPROPYLACETIC ACD TOT: CPT

## 2025-07-12 PROCEDURE — 71045 X-RAY EXAM CHEST 1 VIEW: CPT

## 2025-07-12 PROCEDURE — 83605 ASSAY OF LACTIC ACID: CPT

## 2025-07-12 PROCEDURE — 83735 ASSAY OF MAGNESIUM: CPT

## 2025-07-12 PROCEDURE — 82330 ASSAY OF CALCIUM: CPT

## 2025-07-12 PROCEDURE — 70450 CT HEAD/BRAIN W/O DYE: CPT

## 2025-07-12 PROCEDURE — 70498 CT ANGIOGRAPHY NECK: CPT

## 2025-07-12 PROCEDURE — 85610 PROTHROMBIN TIME: CPT

## 2025-07-12 PROCEDURE — 99284 EMERGENCY DEPT VISIT MOD MDM: CPT | Mod: 25

## 2025-07-12 PROCEDURE — 99285 EMERGENCY DEPT VISIT HI MDM: CPT

## 2025-07-12 PROCEDURE — 82803 BLOOD GASES ANY COMBINATION: CPT

## 2025-07-12 PROCEDURE — 87637 SARSCOV2&INF A&B&RSV AMP PRB: CPT

## 2025-07-12 PROCEDURE — 85018 HEMOGLOBIN: CPT

## 2025-07-12 RX ADMIN — Medication 1000 MILLILITER(S): at 15:25

## 2025-07-14 ENCOUNTER — APPOINTMENT (OUTPATIENT)
Dept: ORTHOPEDIC SURGERY | Facility: CLINIC | Age: 65
End: 2025-07-14

## 2025-07-21 ENCOUNTER — TRANSCRIPTION ENCOUNTER (OUTPATIENT)
Age: 65
End: 2025-07-21

## 2025-08-29 ENCOUNTER — RX RENEWAL (OUTPATIENT)
Age: 65
End: 2025-08-29

## 2025-09-12 ENCOUNTER — RX RENEWAL (OUTPATIENT)
Age: 65
End: 2025-09-12